# Patient Record
Sex: FEMALE | Race: WHITE | HISPANIC OR LATINO
[De-identification: names, ages, dates, MRNs, and addresses within clinical notes are randomized per-mention and may not be internally consistent; named-entity substitution may affect disease eponyms.]

---

## 2017-01-04 PROBLEM — Z00.00 ENCOUNTER FOR PREVENTIVE HEALTH EXAMINATION: Status: ACTIVE | Noted: 2017-01-04

## 2017-03-06 ENCOUNTER — APPOINTMENT (OUTPATIENT)
Dept: NEUROLOGY | Facility: CLINIC | Age: 82
End: 2017-03-06

## 2017-03-06 VITALS
HEIGHT: 62 IN | SYSTOLIC BLOOD PRESSURE: 176 MMHG | BODY MASS INDEX: 19.32 KG/M2 | HEART RATE: 76 BPM | DIASTOLIC BLOOD PRESSURE: 76 MMHG | WEIGHT: 105 LBS

## 2017-03-06 DIAGNOSIS — H40.9 UNSPECIFIED GLAUCOMA: ICD-10-CM

## 2017-03-07 ENCOUNTER — APPOINTMENT (OUTPATIENT)
Dept: MRI IMAGING | Facility: CLINIC | Age: 82
End: 2017-03-07

## 2017-03-07 ENCOUNTER — APPOINTMENT (OUTPATIENT)
Dept: NEUROLOGY | Facility: CLINIC | Age: 82
End: 2017-03-07

## 2017-04-02 ENCOUNTER — FORM ENCOUNTER (OUTPATIENT)
Age: 82
End: 2017-04-02

## 2017-04-03 ENCOUNTER — APPOINTMENT (OUTPATIENT)
Dept: MRI IMAGING | Facility: CLINIC | Age: 82
End: 2017-04-03

## 2017-04-03 ENCOUNTER — OUTPATIENT (OUTPATIENT)
Dept: OUTPATIENT SERVICES | Facility: HOSPITAL | Age: 82
LOS: 1 days | End: 2017-04-03
Payer: COMMERCIAL

## 2017-04-03 DIAGNOSIS — R41.89 OTHER SYMPTOMS AND SIGNS INVOLVING COGNITIVE FUNCTIONS AND AWARENESS: ICD-10-CM

## 2017-04-03 PROCEDURE — 70551 MRI BRAIN STEM W/O DYE: CPT

## 2017-04-24 ENCOUNTER — APPOINTMENT (OUTPATIENT)
Dept: NEUROLOGY | Facility: CLINIC | Age: 82
End: 2017-04-24

## 2017-04-24 VITALS
WEIGHT: 105 LBS | HEIGHT: 62 IN | HEART RATE: 38 BPM | BODY MASS INDEX: 19.32 KG/M2 | DIASTOLIC BLOOD PRESSURE: 79 MMHG | SYSTOLIC BLOOD PRESSURE: 143 MMHG

## 2017-10-24 ENCOUNTER — APPOINTMENT (OUTPATIENT)
Dept: NEUROLOGY | Facility: CLINIC | Age: 82
End: 2017-10-24

## 2018-02-06 ENCOUNTER — APPOINTMENT (OUTPATIENT)
Dept: NEUROLOGY | Facility: CLINIC | Age: 83
End: 2018-02-06
Payer: MEDICARE

## 2018-02-06 VITALS
DIASTOLIC BLOOD PRESSURE: 70 MMHG | BODY MASS INDEX: 19.32 KG/M2 | HEIGHT: 62 IN | HEART RATE: 58 BPM | WEIGHT: 105 LBS | SYSTOLIC BLOOD PRESSURE: 142 MMHG

## 2018-02-06 PROCEDURE — 99214 OFFICE O/P EST MOD 30 MIN: CPT

## 2018-02-06 RX ORDER — LOVASTATIN 20 MG/1
20 TABLET ORAL
Qty: 90 | Refills: 0 | Status: DISCONTINUED | COMMUNITY
Start: 2017-01-26 | End: 2018-02-06

## 2018-02-06 RX ORDER — DONEPEZIL HYDROCHLORIDE 10 MG/1
10 TABLET ORAL DAILY
Qty: 135 | Refills: 1 | Status: DISCONTINUED | COMMUNITY
Start: 2017-03-06 | End: 2018-02-06

## 2018-02-06 RX ORDER — VALSARTAN 80 MG/1
80 TABLET, COATED ORAL
Qty: 90 | Refills: 0 | Status: DISCONTINUED | COMMUNITY
Start: 2017-03-25 | End: 2018-02-06

## 2018-02-06 RX ORDER — TRAZODONE HYDROCHLORIDE 50 MG/1
50 TABLET ORAL
Qty: 90 | Refills: 0 | Status: DISCONTINUED | COMMUNITY
Start: 2016-12-23 | End: 2018-02-06

## 2018-02-09 ENCOUNTER — TRANSCRIPTION ENCOUNTER (OUTPATIENT)
Age: 83
End: 2018-02-09

## 2018-11-06 ENCOUNTER — APPOINTMENT (OUTPATIENT)
Dept: NEUROLOGY | Facility: CLINIC | Age: 83
End: 2018-11-06
Payer: MEDICARE

## 2018-11-06 VITALS — SYSTOLIC BLOOD PRESSURE: 154 MMHG | HEART RATE: 56 BPM | DIASTOLIC BLOOD PRESSURE: 81 MMHG

## 2018-11-06 PROCEDURE — 99214 OFFICE O/P EST MOD 30 MIN: CPT

## 2019-08-06 ENCOUNTER — APPOINTMENT (OUTPATIENT)
Dept: NEUROLOGY | Facility: CLINIC | Age: 84
End: 2019-08-06
Payer: MEDICARE

## 2019-08-06 VITALS
SYSTOLIC BLOOD PRESSURE: 158 MMHG | BODY MASS INDEX: 19.32 KG/M2 | DIASTOLIC BLOOD PRESSURE: 74 MMHG | WEIGHT: 105 LBS | HEART RATE: 66 BPM | HEIGHT: 62 IN

## 2019-08-06 PROCEDURE — 99214 OFFICE O/P EST MOD 30 MIN: CPT

## 2019-08-06 NOTE — PHYSICAL EXAM
[General Appearance - Alert] : alert [General Appearance - In No Acute Distress] : in no acute distress [Oriented To Time, Place, And Person] : oriented to person, place, and time [Impaired Insight] : insight and judgment were intact [Affect] : the affect was normal [Person] : oriented to person [Place] : oriented to place [Time] : oriented to time [Concentration Intact] : normal concentrating ability [Visual Intact] : visual attention was ~T not ~L decreased [Naming Objects] : no difficulty naming common objects [Repeating Phrases] : no difficulty repeating a phrase [Writing A Sentence] : no difficulty writing a sentence [Fluency] : fluency intact [Comprehension] : comprehension intact [Reading] : reading intact [Past History] : adequate knowledge of personal past history [Total Score ___ / 30] : the patient achieved a score of [unfilled] /30 [Date / Time ___ / 5] : date / time [unfilled] / 5 [Place ___ / 5] : place [unfilled] / 5 [Registration ___ / 3] : registration [unfilled] / 3 [Serial Sevens ___/5] : serial sevens [unfilled] / 5 [Naming 2 Objects ___ / 2] : naming two objects [unfilled] / 2 [Repeating a Sentence ___ / 1] : repeating a sentence [unfilled] / 1 [Writing a Sentence ___ / 1] : write sentence [unfilled] / 1 [3-stage Verbal Command ___ / 3] : three-stage verbal command [unfilled] / 3 [Written Command ___ / 1] : written command [unfilled] / 1 [Copy a Design ___ / 1] : copy a design [unfilled] / 1 [Recall ___ / 3] : recall [unfilled] / 3 [Cranial Nerves Optic (II)] : visual acuity intact bilaterally,  visual fields full to confrontation, pupils equal round and reactive to light [Cranial Nerves Oculomotor (III)] : extraocular motion intact [Cranial Nerves Trigeminal (V)] : facial sensation intact symmetrically [Cranial Nerves Facial (VII)] : face symmetrical [Cranial Nerves Vestibulocochlear (VIII)] : hearing was intact bilaterally [Cranial Nerves Glossopharyngeal (IX)] : tongue and palate midline [Cranial Nerves Accessory (XI - Cranial And Spinal)] : head turning and shoulder shrug symmetric [Cranial Nerves Hypoglossal (XII)] : there was no tongue deviation with protrusion [Motor Strength] : muscle strength was normal in all four extremities [Involuntary Movements] : no involuntary movements were seen [No Muscle Atrophy] : normal bulk in all four extremities [Motor Handedness Right-Handed] : the patient is right hand dominant [Sensation Tactile Decrease] : light touch was intact [Sensation Pain / Temperature Decrease] : pain and temperature was intact [Sensation Vibration Decrease] : vibration was intact [Proprioception] : proprioception was intact [Balance] : balance was intact [2+] : Ankle jerk left 2+ [Sclera] : the sclera and conjunctiva were normal [PERRL With Normal Accommodation] : pupils were equal in size, round, reactive to light, with normal accommodation [Extraocular Movements] : extraocular movements were intact [Optic Disc Abnormality] : the optic disc were normal in size and color [No APD] : no afferent pupillary defect [No JOHNNY] : no internuclear ophthalmoplegia [Full Visual Field] : full visual field [Outer Ear] : the ears and nose were normal in appearance [Oropharynx] : the oropharynx was normal [Neck Appearance] : the appearance of the neck was normal [Neck Cervical Mass (___cm)] : no neck mass was observed [Jugular Venous Distention Increased] : there was no jugular-venous distention [Thyroid Diffuse Enlargement] : the thyroid was not enlarged [Thyroid Nodule] : there were no palpable thyroid nodules [Auscultation Breath Sounds / Voice Sounds] : lungs were clear to auscultation bilaterally [Heart Rate And Rhythm] : heart rate was normal and rhythm regular [Heart Sounds] : normal S1 and S2 [Heart Sounds Gallop] : no gallops [Murmurs] : no murmurs [Arterial Pulses Carotid] : carotid pulses were normal with no bruits [Heart Sounds Pericardial Friction Rub] : no pericardial rub [Full Pulse] : the pedal pulses are present [Edema] : there was no peripheral edema [Bowel Sounds] : normal bowel sounds [Abdomen Tenderness] : non-tender [Abdomen Soft] : soft [Abdomen Mass (___ Cm)] : no abdominal mass palpated [No CVA Tenderness] : no ~M costovertebral angle tenderness [No Spinal Tenderness] : no spinal tenderness [Abnormal Walk] : normal gait [Nail Clubbing] : no clubbing  or cyanosis of the fingernails [Musculoskeletal - Swelling] : no joint swelling seen [Motor Tone] : muscle strength and tone were normal [Skin Turgor] : normal skin turgor [Skin Color & Pigmentation] : normal skin color and pigmentation [] : no rash [FreeTextEntry1] : Exam stable. [Motor Strength Upper Extremities Bilaterally] : strength was normal in both upper extremities [Motor Strength Lower Extremities Bilaterally] : strength was normal in both lower extremities [Romberg's Sign] : Romberg's sign was negtive [Allodynia] : no ~T allodynia present [Dysesthesia] : no dysesthesia [Hyperesthesia] : no hyperesthesia [Limited Balance] : balance was intact [Past-pointing] : there was no past-pointing [Tremor] : no tremor present [Coordination - Dysmetria Impaired Finger-to-Nose Bilateral] : not present [Dysdiadochokinesia Bilaterally] : not present [Coordination - Dysmetria Impaired Heel-to-Shin Bilateral] : not present [Plantar Reflex Right Only] : normal on the right [Plantar Reflex Left Only] : normal on the left [FreeTextEntry4] : Presidents: 1/5\par Alt pattern intact.\par Clock: 1/3.

## 2019-08-06 NOTE — HISTORY OF PRESENT ILLNESS
[FreeTextEntry1] : HPI-Interval Hx [id number]:\par Had cataract sx in 2/2019, but does not recall that.\par She feels well, and has no conscience of being forgetful. At times, she has forgot name of grandson.\par Home attendant changed, with better social interaction now.\par Sleep: she tends to wake up and start doing things around the house. No daytime naps.\par Appetite: intact, gained 2lb. \par Motor: ok, no issues. \par Per wife, pt likes to drink wine, may go out and get wine for herself from time to time. She is mostly with the [facility]HA, but spends a few hours a day alone, and in one occasion she has roamed out, was supposed to get her phone fixed but never got to the shop. \par She has minor itch from the patch, managed with anti-H cream.\par \par HPI-Interval Hx [id number]:\par Pt doing fine. She was started on Trazodone 50mg.\par They have not looked into the trials at [facility], daughter works and pt does not want to.\par No AE from meds. \par Eats and sleeps well, bug she might forget to eat her meals at times. \par Per daughter, she has got lost in the last few months a few times, walking in the neighborhood. \par \par HPI-Interval Hx [id number]:\par Did not comply with Aricept, now on Rivastigmine Patch 13.3mg. \par Overall doing well, no AE. Eating well.\par Sleeps overall well, at times she wakes up at random times.\par \par MRI brain reviewed with daughter, showing mixed pathology, mild vascular, and some atrophy in bilateral temporal lobes. \par \par HPI-Interval Hx [id number]:\par MRI, labs and US carotids reviewed. Significant atrophy of FTP lobes. \par Still very active, partly lives with her daughter a few days a week.\par Rest is stable.\par \par PMH:\par 84yo RH HW, HTN, HLD, glaucoma, here for evaluation of dementia. \par Family and PMD have noticed forgetfulness for c.a. 2y.  Unclear how it started, pt says she is fine, daughter does not live with her.\par Currently, she appears to be disoriented at times, she has issues with organization skills, she forgets dates, at times names. \par ADl: intact.\par IADL: needs help for shiela etc.\par Does not drive.\par Sleep: takes Trazodone, at times difficulty falling asleep.\par Mood: no issues. No anxiety.\par Former jose.

## 2019-08-06 NOTE — ASSESSMENT
[FreeTextEntry1] : Assessment: \par 87yo RH HW, cognitive deficit-memory and visuo-spatial issues, no parkinsonism. \par MRI with significant atrophy, not significant vascular lesions. US carotids benign. Labs ok.\par Slow progression, mostly related to STM and orientation, but self care and ADL are still ok.\par Motor-wise she is fine and very active. 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

## 2020-02-10 ENCOUNTER — APPOINTMENT (OUTPATIENT)
Dept: NEUROLOGY | Facility: CLINIC | Age: 85
End: 2020-02-10

## 2020-04-13 ENCOUNTER — APPOINTMENT (OUTPATIENT)
Dept: NEUROLOGY | Facility: CLINIC | Age: 85
End: 2020-04-13

## 2020-09-21 ENCOUNTER — APPOINTMENT (OUTPATIENT)
Dept: NEUROLOGY | Facility: CLINIC | Age: 85
End: 2020-09-21
Payer: MEDICARE

## 2020-09-21 VITALS — TEMPERATURE: 97.3 F

## 2020-09-21 VITALS
SYSTOLIC BLOOD PRESSURE: 110 MMHG | HEART RATE: 53 BPM | DIASTOLIC BLOOD PRESSURE: 64 MMHG | WEIGHT: 105 LBS | HEIGHT: 62 IN | BODY MASS INDEX: 19.32 KG/M2

## 2020-09-21 PROCEDURE — 99214 OFFICE O/P EST MOD 30 MIN: CPT

## 2020-09-21 RX ORDER — RIVASTIGMINE 13.3 MG/24H
13.3 PATCH, EXTENDED RELEASE TRANSDERMAL DAILY
Qty: 30 | Refills: 2 | Status: DISCONTINUED | COMMUNITY
Start: 2018-02-06 | End: 2020-09-21

## 2020-09-21 RX ORDER — RISPERIDONE 0.5 MG/1
0.5 TABLET, FILM COATED ORAL DAILY
Refills: 0 | Status: DISCONTINUED | COMMUNITY
Start: 2020-09-21 | End: 2020-09-21

## 2020-09-21 NOTE — HISTORY OF PRESENT ILLNESS
[FreeTextEntry1] : HPI-Interval Hx [id number]:\par NO COVID.\par \par Since last seen, pt has progressed, STM is worse, she forgets she eats and wants to eat again.\par She has not tolerated Rivastigmine, gave her a rash. Started on Donepezil 10mg again, no AE.\par She was prescribed Risperidone (?mg), for agitation, never started. In one occasion she was upset with the aid.\par Delusions to have seen dead people, unclear if actual hallucinations. \par Sleep: stable.\par Appetite: wants fresh food, and likes to eat.\par Motor: intact.\par ADl: needs prompting for washing; recent UTI with some incontinence and using wrong place to urinate\par IADL: limited; cannot manage medications; cannot cook. \par CDR: 1-1.5.\par \par \par HPI-Interval Hx [id number]:\par Had cataract sx in 2/2019, but does not recall that.\par She feels well, and has no conscience of being forgetful. At times, she has forgot name of grandson.\par Home attendant changed, with better social interaction now.\par Sleep: she tends to wake up and start doing things around the house. No daytime naps.\par Appetite: intact, gained 2lb. \par Motor: ok, no issues. \par Per wife, pt likes to drink wine, may go out and get wine for herself from time to time. She is mostly with the HHA, but spends a few hours a day alone, and in one occasion she has roamed out, was supposed to get her phone fixed but never got to the shop. \par She has minor itch from the patch, managed with anti-H cream.\par \par HPI-Interval Hx [id number]:\par Pt doing fine. She was started on Trazodone 50mg.\par They have not looked into the trials at [facility], daughter works and pt does not want to.\par No AE from meds. \par Eats and sleeps well, bug she might forget to eat her meals at times. \par Per daughter, she has got lost in the last few months a few times, walking in the neighborhood. \par \par HPI-Interval Hx [id number]:\par Did not comply with Aricept, now on Rivastigmine Patch 13.3mg. \par Overall doing well, no AE. Eating well.\par Sleeps overall well, at times she wakes up at random times.\par \par MRI brain reviewed with daughter, showing mixed pathology, mild vascular, and some atrophy in bilateral temporal lobes. \par \par HPI-Interval Hx [id number]:\par MRI, labs and US carotids reviewed. Significant atrophy of FTP lobes. \par Still very active, partly lives with her daughter a few days a week.\par Rest is stable.\par \par PMH:\par 84yo RH HW, HTN, HLD, glaucoma, here for evaluation of dementia. \par Family and PMD have noticed forgetfulness for c.a. 2y.  Unclear how it started, pt says she is fine, daughter does not live with her.\par Currently, she appears to be disoriented at times, she has issues with organization skills, she forgets dates, at times names. \par ADl: intact.\par IADL: needs help for shiela etc.\par Does not drive.\par Sleep: takes Trazodone, at times difficulty falling asleep.\par Mood: no issues. No anxiety.\par Former jose.

## 2020-09-21 NOTE — ASSESSMENT
[FreeTextEntry1] : Assessment: \par 88yo RH HW, cognitive deficit-memory and visuo-spatial issues, no parkinsonism. \par Clinical progression and imaging c/w LOAD.\par \par Ipression: \par LOAD.\par \par Plan: \par -Cont Donepezil 10mg\par -Trazodone 50mg\par -no need for Risperidone\par -keep active.\par \par RTC in 6 months.

## 2020-12-14 ENCOUNTER — TRANSCRIPTION ENCOUNTER (OUTPATIENT)
Age: 85
End: 2020-12-14

## 2020-12-30 NOTE — PHYSICAL EXAM
Oriented - self; Oriented - place; Oriented - time [General Appearance - Alert] : alert [General Appearance - In No Acute Distress] : in no acute distress [Oriented To Time, Place, And Person] : oriented to person, place, and time [Impaired Insight] : insight and judgment were intact [Affect] : the affect was normal [Person] : oriented to person [Place] : oriented to place [Time] : oriented to time [Concentration Intact] : normal concentrating ability [Visual Intact] : visual attention was ~T not ~L decreased [Naming Objects] : no difficulty naming common objects [Repeating Phrases] : no difficulty repeating a phrase [Writing A Sentence] : no difficulty writing a sentence [Fluency] : fluency intact [Comprehension] : comprehension intact [Reading] : reading intact [Past History] : adequate knowledge of personal past history [Registration ___ / 3] : registration [unfilled] / 3 [Naming 2 Objects ___ / 2] : naming two objects [unfilled] / 2 [Repeating a Sentence ___ / 1] : repeating a sentence [unfilled] / 1 [Writing a Sentence ___ / 1] : write sentence [unfilled] / 1 [3-stage Verbal Command ___ / 3] : three-stage verbal command [unfilled] / 3 [Written Command ___ / 1] : written command [unfilled] / 1 [Copy a Design ___ / 1] : copy a design [unfilled] / 1 [Cranial Nerves Optic (II)] : visual acuity intact bilaterally,  visual fields full to confrontation, pupils equal round and reactive to light [Cranial Nerves Oculomotor (III)] : extraocular motion intact [Cranial Nerves Trigeminal (V)] : facial sensation intact symmetrically [Cranial Nerves Facial (VII)] : face symmetrical [Cranial Nerves Vestibulocochlear (VIII)] : hearing was intact bilaterally [Cranial Nerves Glossopharyngeal (IX)] : tongue and palate midline [Cranial Nerves Accessory (XI - Cranial And Spinal)] : head turning and shoulder shrug symmetric [Cranial Nerves Hypoglossal (XII)] : there was no tongue deviation with protrusion [Motor Strength] : muscle strength was normal in all four extremities [Involuntary Movements] : no involuntary movements were seen [No Muscle Atrophy] : normal bulk in all four extremities [Motor Handedness Right-Handed] : the patient is right hand dominant [Sensation Tactile Decrease] : light touch was intact [Sensation Pain / Temperature Decrease] : pain and temperature was intact [Sensation Vibration Decrease] : vibration was intact [Proprioception] : proprioception was intact [Balance] : balance was intact [2+] : Ankle jerk left 2+ [Sclera] : the sclera and conjunctiva were normal [PERRL With Normal Accommodation] : pupils were equal in size, round, reactive to light, with normal accommodation [Extraocular Movements] : extraocular movements were intact [Optic Disc Abnormality] : the optic disc were normal in size and color [No APD] : no afferent pupillary defect [No JOHNNY] : no internuclear ophthalmoplegia [Full Visual Field] : full visual field [Outer Ear] : the ears and nose were normal in appearance [Oropharynx] : the oropharynx was normal [Neck Appearance] : the appearance of the neck was normal [Neck Cervical Mass (___cm)] : no neck mass was observed [Jugular Venous Distention Increased] : there was no jugular-venous distention [Thyroid Diffuse Enlargement] : the thyroid was not enlarged [Thyroid Nodule] : there were no palpable thyroid nodules [Auscultation Breath Sounds / Voice Sounds] : lungs were clear to auscultation bilaterally [Heart Rate And Rhythm] : heart rate was normal and rhythm regular [Heart Sounds] : normal S1 and S2 [Heart Sounds Gallop] : no gallops [Murmurs] : no murmurs [Heart Sounds Pericardial Friction Rub] : no pericardial rub [Arterial Pulses Carotid] : carotid pulses were normal with no bruits [Full Pulse] : the pedal pulses are present [Edema] : there was no peripheral edema [Bowel Sounds] : normal bowel sounds [Abdomen Soft] : soft [Abdomen Tenderness] : non-tender [Abdomen Mass (___ Cm)] : no abdominal mass palpated [No CVA Tenderness] : no ~M costovertebral angle tenderness [No Spinal Tenderness] : no spinal tenderness [Abnormal Walk] : normal gait [Nail Clubbing] : no clubbing  or cyanosis of the fingernails [Musculoskeletal - Swelling] : no joint swelling seen [Motor Tone] : muscle strength and tone were normal [Skin Color & Pigmentation] : normal skin color and pigmentation [Skin Turgor] : normal skin turgor [] : no rash [Total Score ___ / 30] : the patient achieved a score of [unfilled] /30 [Date / Time ___ / 5] : date / time [unfilled] / 5 [Place ___ / 5] : place [unfilled] / 5 [Serial Sevens ___/5] : serial sevens [unfilled] / 5 [Recall ___ / 3] : recall [unfilled] / 3 [Motor Strength Upper Extremities Bilaterally] : strength was normal in both upper extremities [Motor Strength Lower Extremities Bilaterally] : strength was normal in both lower extremities [Romberg's Sign] : Romberg's sign was negtive [Allodynia] : no ~T allodynia present [Dysesthesia] : no dysesthesia [Hyperesthesia] : no hyperesthesia [Limited Balance] : balance was intact [Past-pointing] : there was no past-pointing [Tremor] : no tremor present [Dysdiadochokinesia Bilaterally] : not present [Coordination - Dysmetria Impaired Finger-to-Nose Bilateral] : not present [Coordination - Dysmetria Impaired Heel-to-Shin Bilateral] : not present [Plantar Reflex Right Only] : normal on the right [Plantar Reflex Left Only] : normal on the left [FreeTextEntry4] : Presidents: 0/5\par Alt pattern intact.\par Clock: 1/3.

## 2021-03-01 ENCOUNTER — APPOINTMENT (OUTPATIENT)
Dept: NEUROLOGY | Facility: CLINIC | Age: 86
End: 2021-03-01
Payer: MEDICARE

## 2021-03-01 VITALS
SYSTOLIC BLOOD PRESSURE: 169 MMHG | HEIGHT: 62 IN | HEART RATE: 52 BPM | DIASTOLIC BLOOD PRESSURE: 78 MMHG | BODY MASS INDEX: 19.51 KG/M2 | WEIGHT: 106 LBS

## 2021-03-01 VITALS — TEMPERATURE: 97.7 F

## 2021-03-01 PROCEDURE — 99072 ADDL SUPL MATRL&STAF TM PHE: CPT

## 2021-03-01 PROCEDURE — 99214 OFFICE O/P EST MOD 30 MIN: CPT

## 2021-03-01 NOTE — ASSESSMENT
[FreeTextEntry1] : Assessment: \par 86yo RH HW, progressive cognitive decline, due to Alzheimer's disease.\par Significant progression, with cognitive and functional decline since 9/2020. \par Motor issues are due to deconditioning/sedentary life, no parkinsonism.\par \par Ipression: \par -LOAD\par -deconditioning\par \par Plan: \par -Cont Donepezil 10mg\par -Trazodone 50mg\par -keep active-would try at home first and consider PT if needed.\par \par \par A thorough discussion was entertained with the patient/caregiver regarding the use of psychoactive medications, their possible benefits and AE profile, including the risk of cardiovascular complications, including but not limited to applicable black box warning and teratogenicity, where appropriate.\par We discussed the benefits of being active, physically and mentally, and the need to to establish a routine in this respect.\par Driving abilities and firearms possession and use were discussed, in relation to progression of the cognitive decline, and the need to assess them periodically.\par Patient/caregiver advised to bring previous records to this office.\par All questions were answered at the time of the visit. We are certainly available for further discussion as needed.\par Patient/caregiver fully understands and agree with the plan.\par

## 2021-03-01 NOTE — HISTORY OF PRESENT ILLNESS
[FreeTextEntry1] : HPI-Interval Hx [id number]:\par pt here with daughter for follow-up\par Over the last few months, she has declined significantly\par She has mis-recognition of her own home and often asks to leave; she was found wandering outside, and was brought back by passers-by in a few occasions\par She confuses night and day, but overall sleeps well\par Appetite is stable, she needs to be reminded to eat, no change in weight\par STM is poorer as well\par Communication is ok, speech preserved.\par Motor: slower, more careful with steps; one fall a month ago, tripped; more sedentary lately, does not like to move much\par She sometimes does not recognize familiar people around her.\par Psych: tends to get upset easily, but no overt agitation, nor productive symptoms. \par \par ADL: limited to eat, dressing and washing\par IADL: poor; can use the TV.\par CDR: 2.0.\par \par \par HPI-Interval Hx 20200921:\par NO COVID.\par \par Since last seen, pt has progressed, STM is worse, she forgets she eats and wants to eat again.\par She has not tolerated Rivastigmine, gave her a rash. Started on Donepezil 10mg again, no AE.\par She was prescribed Risperidone (?mg), for agitation, never started. In one occasion she was upset with the aid.\par Delusions to have seen dead people, unclear if actual hallucinations. \par Sleep: stable.\par Appetite: wants fresh food, and likes to eat.\par Motor: intact.\par ADl: needs prompting for washing; recent UTI with some incontinence and using wrong place to urinate\par IADL: limited; cannot manage medications; cannot cook. \par CDR: 1-1.5.\par \par \par HPI-Interval Hx [id number]:\par Had cataract sx in 2/2019, but does not recall that.\par She feels well, and has no conscience of being forgetful. At times, she has forgot name of grandson.\par Home attendant changed, with better social interaction now.\par Sleep: she tends to wake up and start doing things around the house. No daytime naps.\par Appetite: intact, gained 2lb. \par Motor: ok, no issues. \par Per wife, pt likes to drink wine, may go out and get wine for herself from time to time. She is mostly with the HHA, but spends a few hours a day alone, and in one occasion she has roamed out, was supposed to get her phone fixed but never got to the shop. \par She has minor itch from the patch, managed with anti-H cream.\par \par HPI-Interval Hx [id number]:\par Pt doing fine. She was started on Trazodone 50mg.\par They have not looked into the trials at [facility], daughter works and pt does not want to.\par No AE from meds. \par Eats and sleeps well, bug she might forget to eat her meals at times. \par Per daughter, she has got lost in the last few months a few times, walking in the neighborhood. \par \par HPI-Interval Hx [id number]:\par Did not comply with Aricept, now on Rivastigmine Patch 13.3mg. \par Overall doing well, no AE. Eating well.\par Sleeps overall well, at times she wakes up at random times.\par \par MRI brain reviewed with daughter, showing mixed pathology, mild vascular, and some atrophy in bilateral temporal lobes. \par \par HPI-Interval Hx [id number]:\par MRI, labs and US carotids reviewed. Significant atrophy of FTP lobes. \par Still very active, partly lives with her daughter a few days a week.\par Rest is stable.\par \par PMH:\par 84yo RH HW, HTN, HLD, glaucoma, here for evaluation of dementia. \par Family and PMD have noticed forgetfulness for c.a. 2y.  Unclear how it started, pt says she is fine, daughter does not live with her.\par Currently, she appears to be disoriented at times, she has issues with organization skills, she forgets dates, at times names. \par ADl: intact.\par IADL: needs help for shiela etc.\par Does not drive.\par Sleep: takes Trazodone, at times difficulty falling asleep.\par Mood: no issues. No anxiety.\par Former jose.

## 2021-03-01 NOTE — PHYSICAL EXAM
[General Appearance - Alert] : alert [General Appearance - In No Acute Distress] : in no acute distress [Oriented To Time, Place, And Person] : oriented to person, place, and time [Impaired Insight] : insight and judgment were intact [Affect] : the affect was normal [Person] : oriented to person [Concentration Intact] : normal concentrating ability [Visual Intact] : visual attention was ~T not ~L decreased [Naming Objects] : no difficulty naming common objects [Repeating Phrases] : no difficulty repeating a phrase [Writing A Sentence] : no difficulty writing a sentence [Fluency] : fluency intact [Comprehension] : comprehension intact [Reading] : reading intact [Past History] : adequate knowledge of personal past history [Vocabulary] : adequate range of vocabulary [Total Score ___ / 30] : the patient achieved a score of [unfilled] /30 [Date / Time ___ / 5] : date / time [unfilled] / 5 [Place ___ / 5] : place [unfilled] / 5 [Registration ___ / 3] : registration [unfilled] / 3 [Serial Sevens ___/5] : serial sevens [unfilled] / 5 [Naming 2 Objects ___ / 2] : naming two objects [unfilled] / 2 [Repeating a Sentence ___ / 1] : repeating a sentence [unfilled] / 1 [Writing a Sentence ___ / 1] : write sentence [unfilled] / 1 [3-stage Verbal Command ___ / 3] : three-stage verbal command [unfilled] / 3 [Written Command ___ / 1] : written command [unfilled] / 1 [Copy a Design ___ / 1] : copy a design [unfilled] / 1 [Recall ___ / 3] : recall [unfilled] / 3 [Cranial Nerves Optic (II)] : visual acuity intact bilaterally,  visual fields full to confrontation, pupils equal round and reactive to light [Cranial Nerves Oculomotor (III)] : extraocular motion intact [Cranial Nerves Trigeminal (V)] : facial sensation intact symmetrically [Cranial Nerves Facial (VII)] : face symmetrical [Cranial Nerves Vestibulocochlear (VIII)] : hearing was intact bilaterally [Cranial Nerves Glossopharyngeal (IX)] : tongue and palate midline [Cranial Nerves Accessory (XI - Cranial And Spinal)] : head turning and shoulder shrug symmetric [Cranial Nerves Hypoglossal (XII)] : there was no tongue deviation with protrusion [Motor Strength] : muscle strength was normal in all four extremities [Involuntary Movements] : no involuntary movements were seen [No Muscle Atrophy] : normal bulk in all four extremities [Motor Handedness Right-Handed] : the patient is right hand dominant [Sensation Tactile Decrease] : light touch was intact [Sensation Pain / Temperature Decrease] : pain and temperature was intact [Sensation Vibration Decrease] : vibration was intact [Proprioception] : proprioception was intact [Balance] : balance was intact [2+] : Ankle jerk left 2+ [Sclera] : the sclera and conjunctiva were normal [PERRL With Normal Accommodation] : pupils were equal in size, round, reactive to light, with normal accommodation [Extraocular Movements] : extraocular movements were intact [Optic Disc Abnormality] : the optic disc were normal in size and color [No APD] : no afferent pupillary defect [No JOHNNY] : no internuclear ophthalmoplegia [Full Visual Field] : full visual field [Outer Ear] : the ears and nose were normal in appearance [Oropharynx] : the oropharynx was normal [Neck Appearance] : the appearance of the neck was normal [Neck Cervical Mass (___cm)] : no neck mass was observed [Jugular Venous Distention Increased] : there was no jugular-venous distention [Thyroid Diffuse Enlargement] : the thyroid was not enlarged [Thyroid Nodule] : there were no palpable thyroid nodules [Auscultation Breath Sounds / Voice Sounds] : lungs were clear to auscultation bilaterally [Heart Rate And Rhythm] : heart rate was normal and rhythm regular [Heart Sounds] : normal S1 and S2 [Heart Sounds Gallop] : no gallops [Murmurs] : no murmurs [Heart Sounds Pericardial Friction Rub] : no pericardial rub [Arterial Pulses Carotid] : carotid pulses were normal with no bruits [Full Pulse] : the pedal pulses are present [Edema] : there was no peripheral edema [Bowel Sounds] : normal bowel sounds [Abdomen Soft] : soft [Abdomen Tenderness] : non-tender [Abdomen Mass (___ Cm)] : no abdominal mass palpated [No CVA Tenderness] : no ~M costovertebral angle tenderness [No Spinal Tenderness] : no spinal tenderness [Abnormal Walk] : normal gait [Nail Clubbing] : no clubbing  or cyanosis of the fingernails [Musculoskeletal - Swelling] : no joint swelling seen [Motor Tone] : muscle strength and tone were normal [Skin Color & Pigmentation] : normal skin color and pigmentation [Skin Turgor] : normal skin turgor [] : no rash [Place] : disoriented to place [Time] : disoriented to time [Current Events] : inadequate knowledge of current events [Motor Strength Upper Extremities Bilaterally] : strength was normal in both upper extremities [Motor Strength Lower Extremities Bilaterally] : strength was normal in both lower extremities [Romberg's Sign] : Romberg's sign was negtive [Allodynia] : no ~T allodynia present [Dysesthesia] : no dysesthesia [Hyperesthesia] : no hyperesthesia [Limited Balance] : balance was intact [Past-pointing] : there was no past-pointing [Tremor] : no tremor present [Dysdiadochokinesia Bilaterally] : not present [Coordination - Dysmetria Impaired Finger-to-Nose Bilateral] : not present [Coordination - Dysmetria Impaired Heel-to-Shin Bilateral] : not present [Plantar Reflex Right Only] : normal on the right [Plantar Reflex Left Only] : normal on the left [FreeTextEntry4] : Presidents: 0/5\par Alt pattern intact.\par Clock: 1/3.

## 2021-06-07 ENCOUNTER — APPOINTMENT (OUTPATIENT)
Dept: NEUROLOGY | Facility: CLINIC | Age: 86
End: 2021-06-07
Payer: MEDICARE

## 2021-06-07 VITALS
SYSTOLIC BLOOD PRESSURE: 121 MMHG | HEART RATE: 59 BPM | HEIGHT: 62 IN | DIASTOLIC BLOOD PRESSURE: 72 MMHG | BODY MASS INDEX: 17.66 KG/M2 | WEIGHT: 96 LBS

## 2021-06-07 PROCEDURE — 99214 OFFICE O/P EST MOD 30 MIN: CPT

## 2021-06-07 NOTE — HISTORY OF PRESENT ILLNESS
[FreeTextEntry1] : HPI-Interval Hx 20210607:\par pt had a few falls in April, one with broken nose.\par Also got PPM put in in April. \par Likely bradycardia with heart block.\par Also started on Risperidone, now 1mg BID.\par Appetite ok, but she has lost weight.\par Sleep: takes a long time to fall asleep.\par More restless, per daughter there is some shuffle.\par \par \par HPI-Interval Hx [id number]:\par pt here with daughter for follow-up\par Over the last few months, she has declined significantly\par She has mis-recognition of her own home and often asks to leave; she was found wandering outside, and was brought back by passers-by in a few occasions\par She confuses night and day, but overall sleeps well\par Appetite is stable, she needs to be reminded to eat, no change in weight\par STM is poorer as well\par Communication is ok, speech preserved.\par Motor: slower, more careful with steps; one fall a month ago, tripped; more sedentary lately, does not like to move much\par She sometimes does not recognize familiar people around her.\par Psych: tends to get upset easily, but no overt agitation, nor productive symptoms. \par \par ADL: limited to eat, dressing and washing\par IADL: poor; can use the TV.\par CDR: 2.0.\par \par \par HPI-Interval Hx [id number]:\par NO COVID.\par \par Since last seen, pt has progressed, STM is worse, she forgets she eats and wants to eat again.\par She has not tolerated Rivastigmine, gave her a rash. Started on Donepezil 10mg again, no AE.\par She was prescribed Risperidone (?mg), for agitation, never started. In one occasion she was upset with the aid.\par Delusions to have seen dead people, unclear if actual hallucinations. \par Sleep: stable.\par Appetite: wants fresh food, and likes to eat.\par Motor: intact.\par ADl: needs prompting for washing; recent UTI with some incontinence and using wrong place to urinate\par IADL: limited; cannot manage medications; cannot cook. \par CDR: 1-1.5.\par \par \par HPI-Interval Hx [id number]:\par Had cataract sx in 2/2019, but does not recall that.\par She feels well, and has no conscience of being forgetful. At times, she has forgot name of grandson.\par Home attendant changed, with better social interaction now.\par Sleep: she tends to wake up and start doing things around the house. No daytime naps.\par Appetite: intact, gained 2lb. \par Motor: ok, no issues. \par Per wife, pt likes to drink wine, may go out and get wine for herself from time to time. She is mostly with the HHA, but spends a few hours a day alone, and in one occasion she has roamed out, was supposed to get her phone fixed but never got to the shop. \par She has minor itch from the patch, managed with anti-H cream.\par \par HPI-Interval Hx [id number]:\par Pt doing fine. She was started on Trazodone 50mg.\par They have not looked into the trials at [facility], daughter works and pt does not want to.\par No AE from meds. \par Eats and sleeps well, bug she might forget to eat her meals at times. \par Per daughter, she has got lost in the last few months a few times, walking in the neighborhood. \par \par HPI-Interval Hx [id number]:\par Did not comply with Aricept, now on Rivastigmine Patch 13.3mg. \par Overall doing well, no AE. Eating well.\par Sleeps overall well, at times she wakes up at random times.\par \par MRI brain reviewed with daughter, showing mixed pathology, mild vascular, and some atrophy in bilateral temporal lobes. \par \par HPI-Interval Hx [id number]:\par MRI, labs and US carotids reviewed. Significant atrophy of FTP lobes. \par Still very active, partly lives with her daughter a few days a week.\par Rest is stable.\par \par PMH:\par 82yo RH HW, HTN, HLD, glaucoma, here for evaluation of dementia. \par Family and PMD have noticed forgetfulness for c.a. 2y.  Unclear how it started, pt says she is fine, daughter does not live with her.\par Currently, she appears to be disoriented at times, she has issues with organization skills, she forgets dates, at times names. \par ADl: intact.\par IADL: needs help for shiela etc.\par Does not drive.\par Sleep: takes Trazodone, at times difficulty falling asleep.\par Mood: no issues. No anxiety.\par Former jose.

## 2021-06-07 NOTE — ASSESSMENT
[FreeTextEntry1] : Assessment: \par 87yo RH HW, progressive cognitive decline, due to Alzheimer's disease.\par Significant progression, with cognitive and functional decline. \par Motor issues are due to deconditioning/sedentary life, no parkinsonism, but the recent use of Risperidone may have given her some EPS.\par Would prefer to change to Olanzapine.\par \par Ipression: \par -LOAD\par -deconditioning\par -agitation-some aggressive behavior with HHA\par \par Plan: \par -reduce Risperidone to 0.5mg BID and after we get the labs from PCP we can change to olanzapine.\par Monitor appetite and weight.\par \par \par A thorough discussion was entertained with the patient/caregiver regarding the use of psychoactive medications, their possible benefits and AE profile, including the risk of cardiovascular complications, including but not limited to applicable black box warning and teratogenicity, where appropriate.\par We discussed the benefits of being active, physically and mentally, and the need to to establish a routine in this respect.\par Driving abilities and firearms possession and use were discussed, in relation to progression of the cognitive decline, and the need to assess them periodically.\par Patient/caregiver advised to bring previous records to this office.\par All questions were answered at the time of the visit. We are certainly available for further discussion as needed.\par Patient/caregiver fully understands and agree with the plan.\par  no no

## 2021-06-07 NOTE — PHYSICAL EXAM
[General Appearance - Alert] : alert [General Appearance - In No Acute Distress] : in no acute distress [Oriented To Time, Place, And Person] : oriented to person, place, and time [Impaired Insight] : insight and judgment were intact [Affect] : the affect was normal [Person] : oriented to person [Concentration Intact] : normal concentrating ability [Visual Intact] : visual attention was ~T not ~L decreased [Naming Objects] : no difficulty naming common objects [Repeating Phrases] : no difficulty repeating a phrase [Writing A Sentence] : no difficulty writing a sentence [Fluency] : fluency intact [Comprehension] : comprehension intact [Reading] : reading intact [Past History] : adequate knowledge of personal past history [Vocabulary] : adequate range of vocabulary [Total Score ___ / 30] : the patient achieved a score of [unfilled] /30 [Date / Time ___ / 5] : date / time [unfilled] / 5 [Place ___ / 5] : place [unfilled] / 5 [Registration ___ / 3] : registration [unfilled] / 3 [Serial Sevens ___/5] : serial sevens [unfilled] / 5 [Naming 2 Objects ___ / 2] : naming two objects [unfilled] / 2 [Repeating a Sentence ___ / 1] : repeating a sentence [unfilled] / 1 [Writing a Sentence ___ / 1] : write sentence [unfilled] / 1 [3-stage Verbal Command ___ / 3] : three-stage verbal command [unfilled] / 3 [Written Command ___ / 1] : written command [unfilled] / 1 [Copy a Design ___ / 1] : copy a design [unfilled] / 1 [Recall ___ / 3] : recall [unfilled] / 3 [Cranial Nerves Optic (II)] : visual acuity intact bilaterally,  visual fields full to confrontation, pupils equal round and reactive to light [Cranial Nerves Oculomotor (III)] : extraocular motion intact [Cranial Nerves Trigeminal (V)] : facial sensation intact symmetrically [Cranial Nerves Facial (VII)] : face symmetrical [Cranial Nerves Vestibulocochlear (VIII)] : hearing was intact bilaterally [Cranial Nerves Glossopharyngeal (IX)] : tongue and palate midline [Cranial Nerves Accessory (XI - Cranial And Spinal)] : head turning and shoulder shrug symmetric [Cranial Nerves Hypoglossal (XII)] : there was no tongue deviation with protrusion [Motor Strength] : muscle strength was normal in all four extremities [Involuntary Movements] : no involuntary movements were seen [No Muscle Atrophy] : normal bulk in all four extremities [Motor Handedness Right-Handed] : the patient is right hand dominant [Sensation Tactile Decrease] : light touch was intact [Sensation Pain / Temperature Decrease] : pain and temperature was intact [Sensation Vibration Decrease] : vibration was intact [Proprioception] : proprioception was intact [Balance] : balance was intact [2+] : Ankle jerk left 2+ [Sclera] : the sclera and conjunctiva were normal [PERRL With Normal Accommodation] : pupils were equal in size, round, reactive to light, with normal accommodation [Extraocular Movements] : extraocular movements were intact [Optic Disc Abnormality] : the optic disc were normal in size and color [No APD] : no afferent pupillary defect [No JOHNNY] : no internuclear ophthalmoplegia [Full Visual Field] : full visual field [Outer Ear] : the ears and nose were normal in appearance [Oropharynx] : the oropharynx was normal [Neck Appearance] : the appearance of the neck was normal [Neck Cervical Mass (___cm)] : no neck mass was observed [Jugular Venous Distention Increased] : there was no jugular-venous distention [Thyroid Diffuse Enlargement] : the thyroid was not enlarged [Thyroid Nodule] : there were no palpable thyroid nodules [Auscultation Breath Sounds / Voice Sounds] : lungs were clear to auscultation bilaterally [Heart Rate And Rhythm] : heart rate was normal and rhythm regular [Heart Sounds] : normal S1 and S2 [Heart Sounds Gallop] : no gallops [Murmurs] : no murmurs [Heart Sounds Pericardial Friction Rub] : no pericardial rub [Arterial Pulses Carotid] : carotid pulses were normal with no bruits [Full Pulse] : the pedal pulses are present [Edema] : there was no peripheral edema [Bowel Sounds] : normal bowel sounds [Abdomen Soft] : soft [Abdomen Tenderness] : non-tender [Abdomen Mass (___ Cm)] : no abdominal mass palpated [No CVA Tenderness] : no ~M costovertebral angle tenderness [No Spinal Tenderness] : no spinal tenderness [Abnormal Walk] : normal gait [Nail Clubbing] : no clubbing  or cyanosis of the fingernails [Musculoskeletal - Swelling] : no joint swelling seen [Motor Tone] : muscle strength and tone were normal [Skin Color & Pigmentation] : normal skin color and pigmentation [Skin Turgor] : normal skin turgor [] : no rash [Place] : disoriented to place [Time] : disoriented to time [Current Events] : inadequate knowledge of current events [Motor Strength Upper Extremities Bilaterally] : strength was normal in both upper extremities [Motor Strength Lower Extremities Bilaterally] : strength was normal in both lower extremities [Romberg's Sign] : Romberg's sign was negtive [Allodynia] : no ~T allodynia present [Dysesthesia] : no dysesthesia [Hyperesthesia] : no hyperesthesia [Limited Balance] : balance was intact [Past-pointing] : there was no past-pointing [Tremor] : no tremor present [Dysdiadochokinesia Bilaterally] : not present [Coordination - Dysmetria Impaired Finger-to-Nose Bilateral] : not present [Coordination - Dysmetria Impaired Heel-to-Shin Bilateral] : not present [Plantar Reflex Right Only] : normal on the right [Plantar Reflex Left Only] : normal on the left [FreeTextEntry4] : Presidents: 0/5\par Alt pattern intact.\par Clock: 1/3. [FreeTextEntry6] : Mild increased tone in LUE. [FreeTextEntry8] : short steps, mild shuffle, more rigid.

## 2021-06-30 RX ORDER — RISPERIDONE 1 MG/1
1 TABLET, FILM COATED ORAL TWICE DAILY
Qty: 30 | Refills: 3 | Status: DISCONTINUED | COMMUNITY
Start: 2021-05-18 | End: 2021-06-30

## 2021-06-30 RX ORDER — AMLODIPINE BESYLATE 10 MG/1
10 TABLET ORAL DAILY
Refills: 0 | Status: DISCONTINUED | COMMUNITY
Start: 2017-03-02 | End: 2021-06-30

## 2021-08-06 ENCOUNTER — APPOINTMENT (OUTPATIENT)
Dept: GERIATRICS | Facility: CLINIC | Age: 86
End: 2021-08-06
Payer: MEDICARE

## 2021-08-06 ENCOUNTER — NON-APPOINTMENT (OUTPATIENT)
Age: 86
End: 2021-08-06

## 2021-08-06 VITALS
SYSTOLIC BLOOD PRESSURE: 110 MMHG | RESPIRATION RATE: 14 BRPM | OXYGEN SATURATION: 97 % | WEIGHT: 101.13 LBS | HEART RATE: 70 BPM | HEIGHT: 62 IN | DIASTOLIC BLOOD PRESSURE: 70 MMHG | BODY MASS INDEX: 18.61 KG/M2
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
## 2021-08-06 DIAGNOSIS — M79.89 OTHER SPECIFIED SOFT TISSUE DISORDERS: ICD-10-CM

## 2021-08-06 PROCEDURE — 99205 OFFICE O/P NEW HI 60 MIN: CPT | Mod: 25

## 2021-08-06 PROCEDURE — G0444 DEPRESSION SCREEN ANNUAL: CPT

## 2021-08-13 ENCOUNTER — TRANSCRIPTION ENCOUNTER (OUTPATIENT)
Age: 86
End: 2021-08-13

## 2021-08-17 PROBLEM — M79.89 HAND SWELLING: Status: ACTIVE | Noted: 2021-08-17

## 2021-08-23 ENCOUNTER — LABORATORY RESULT (OUTPATIENT)
Age: 86
End: 2021-08-23

## 2021-08-23 ENCOUNTER — NON-APPOINTMENT (OUTPATIENT)
Age: 86
End: 2021-08-23

## 2021-08-23 ENCOUNTER — APPOINTMENT (OUTPATIENT)
Dept: GERIATRICS | Facility: CLINIC | Age: 86
End: 2021-08-23
Payer: MEDICARE

## 2021-08-23 VITALS
SYSTOLIC BLOOD PRESSURE: 150 MMHG | TEMPERATURE: 97.5 F | HEART RATE: 60 BPM | DIASTOLIC BLOOD PRESSURE: 90 MMHG | OXYGEN SATURATION: 97 % | BODY MASS INDEX: 17.92 KG/M2 | WEIGHT: 97.38 LBS | HEIGHT: 62 IN | RESPIRATION RATE: 15 BRPM

## 2021-08-23 VITALS — DIASTOLIC BLOOD PRESSURE: 80 MMHG | SYSTOLIC BLOOD PRESSURE: 158 MMHG

## 2021-08-23 DIAGNOSIS — Z09 ENCOUNTER FOR FOLLOW-UP EXAMINATION AFTER COMPLETED TREATMENT FOR CONDITIONS OTHER THAN MALIGNANT NEOPLASM: ICD-10-CM

## 2021-08-23 DIAGNOSIS — Z11.59 ENCOUNTER FOR SCREENING FOR OTHER VIRAL DISEASES: ICD-10-CM

## 2021-08-23 DIAGNOSIS — I42.9 CARDIOMYOPATHY, UNSPECIFIED: ICD-10-CM

## 2021-08-23 DIAGNOSIS — Z86.79 PERSONAL HISTORY OF OTHER DISEASES OF THE CIRCULATORY SYSTEM: ICD-10-CM

## 2021-08-23 PROCEDURE — 99483 ASSMT & CARE PLN PT COG IMP: CPT

## 2021-08-23 PROCEDURE — 93000 ELECTROCARDIOGRAM COMPLETE: CPT

## 2021-08-23 RX ORDER — LISINOPRIL 5 MG/1
5 TABLET ORAL DAILY
Refills: 0 | Status: DISCONTINUED | COMMUNITY
Start: 2021-06-30 | End: 2021-08-23

## 2021-08-23 RX ORDER — MECOBALAMIN 1000 MCG
1 TABLET,CHEWABLE ORAL
Refills: 0 | Status: DISCONTINUED | COMMUNITY
Start: 2021-03-01 | End: 2021-08-23

## 2021-08-24 LAB
25(OH)D3 SERPL-MCNC: 84.8 NG/ML
ALBUMIN SERPL ELPH-MCNC: 4.3 G/DL
ALP BLD-CCNC: 60 U/L
ALT SERPL-CCNC: 10 U/L
ANION GAP SERPL CALC-SCNC: 11 MMOL/L
APPEARANCE: ABNORMAL
AST SERPL-CCNC: 16 U/L
BASOPHILS # BLD AUTO: 0.07 K/UL
BASOPHILS NFR BLD AUTO: 0.9 %
BILIRUB SERPL-MCNC: 0.4 MG/DL
BILIRUBIN URINE: NEGATIVE
BLOOD URINE: NEGATIVE
BUN SERPL-MCNC: 12 MG/DL
CALCIUM SERPL-MCNC: 9.9 MG/DL
CHLORIDE SERPL-SCNC: 101 MMOL/L
CHOLEST SERPL-MCNC: 217 MG/DL
CO2 SERPL-SCNC: 28 MMOL/L
COLOR: NORMAL
COVID-19 NUCLEOCAPSID  GAM ANTIBODY INTERPRETATION: NEGATIVE
COVID-19 SPIKE DOMAIN ANTIBODY INTERPRETATION: POSITIVE
CREAT SERPL-MCNC: 0.69 MG/DL
EOSINOPHIL # BLD AUTO: 0.17 K/UL
EOSINOPHIL NFR BLD AUTO: 2.1 %
ESTIMATED AVERAGE GLUCOSE: 111 MG/DL
FOLATE SERPL-MCNC: 13.9 NG/ML
GLUCOSE QUALITATIVE U: NEGATIVE
GLUCOSE SERPL-MCNC: 98 MG/DL
HBA1C MFR BLD HPLC: 5.5 %
HCT VFR BLD CALC: 43.8 %
HDLC SERPL-MCNC: 96 MG/DL
HGB BLD-MCNC: 14.5 G/DL
HIV1+2 AB SPEC QL IA.RAPID: NONREACTIVE
IMM GRANULOCYTES NFR BLD AUTO: 0.4 %
KETONES URINE: NEGATIVE
LDLC SERPL CALC-MCNC: 105 MG/DL
LEUKOCYTE ESTERASE URINE: NEGATIVE
LYMPHOCYTES # BLD AUTO: 1.61 K/UL
LYMPHOCYTES NFR BLD AUTO: 20.2 %
MAN DIFF?: NORMAL
MCHC RBC-ENTMCNC: 31 PG
MCHC RBC-ENTMCNC: 33.1 GM/DL
MCV RBC AUTO: 93.8 FL
MONOCYTES # BLD AUTO: 0.79 K/UL
MONOCYTES NFR BLD AUTO: 9.9 %
NEUTROPHILS # BLD AUTO: 5.32 K/UL
NEUTROPHILS NFR BLD AUTO: 66.5 %
NITRITE URINE: NEGATIVE
NONHDLC SERPL-MCNC: 121 MG/DL
PH URINE: 8.5
PLATELET # BLD AUTO: 304 K/UL
POTASSIUM SERPL-SCNC: 4.3 MMOL/L
PROT SERPL-MCNC: 7.1 G/DL
PROTEIN URINE: NEGATIVE
RBC # BLD: 4.67 M/UL
RBC # FLD: 13 %
SARS-COV-2 AB SERPL IA-ACNC: >250 U/ML
SARS-COV-2 AB SERPL QL IA: 0.07 INDEX
SODIUM SERPL-SCNC: 140 MMOL/L
SPECIFIC GRAVITY URINE: 1.01
T PALLIDUM AB SER QL IA: NEGATIVE
TRIGL SERPL-MCNC: 77 MG/DL
TSH SERPL-ACNC: 1.47 UIU/ML
UROBILINOGEN URINE: NORMAL
VIT B12 SERPL-MCNC: 910 PG/ML
WBC # FLD AUTO: 7.99 K/UL

## 2021-08-24 RX ORDER — ERGOCALCIFEROL 1.25 MG/1
1.25 MG CAPSULE, LIQUID FILLED ORAL
Refills: 0 | Status: DISCONTINUED | COMMUNITY
Start: 2016-07-25 | End: 2021-08-24

## 2021-08-26 LAB — VIT B1 SERPL-MCNC: 162.6 NMOL/L

## 2021-08-29 ENCOUNTER — EMERGENCY (EMERGENCY)
Facility: HOSPITAL | Age: 86
LOS: 1 days | Discharge: ANOTHER HOSPITAL | End: 2021-08-29
Attending: EMERGENCY MEDICINE
Payer: COMMERCIAL

## 2021-08-29 ENCOUNTER — INPATIENT (INPATIENT)
Facility: HOSPITAL | Age: 86
LOS: 1 days | Discharge: HOME HEALTH | DRG: 86 | End: 2021-08-31
Attending: INTERNAL MEDICINE | Admitting: HOSPITALIST
Payer: COMMERCIAL

## 2021-08-29 VITALS
SYSTOLIC BLOOD PRESSURE: 155 MMHG | OXYGEN SATURATION: 94 % | RESPIRATION RATE: 18 BRPM | DIASTOLIC BLOOD PRESSURE: 73 MMHG | HEART RATE: 70 BPM | WEIGHT: 95.9 LBS | TEMPERATURE: 98 F

## 2021-08-29 VITALS
HEART RATE: 68 BPM | DIASTOLIC BLOOD PRESSURE: 80 MMHG | SYSTOLIC BLOOD PRESSURE: 185 MMHG | OXYGEN SATURATION: 97 % | RESPIRATION RATE: 18 BRPM | TEMPERATURE: 99 F | WEIGHT: 95.9 LBS

## 2021-08-29 VITALS
RESPIRATION RATE: 18 BRPM | OXYGEN SATURATION: 100 % | DIASTOLIC BLOOD PRESSURE: 87 MMHG | HEART RATE: 80 BPM | TEMPERATURE: 98 F | SYSTOLIC BLOOD PRESSURE: 184 MMHG

## 2021-08-29 DIAGNOSIS — S06.5X9A TRAUMATIC SUBDURAL HEMORRHAGE WITH LOSS OF CONSCIOUSNESS OF UNSPECIFIED DURATION, INITIAL ENCOUNTER: ICD-10-CM

## 2021-08-29 LAB
ALBUMIN SERPL ELPH-MCNC: 3.2 G/DL — LOW (ref 3.5–5)
ALP SERPL-CCNC: 55 U/L — SIGNIFICANT CHANGE UP (ref 40–120)
ALT FLD-CCNC: 14 U/L DA — SIGNIFICANT CHANGE UP (ref 10–60)
ANION GAP SERPL CALC-SCNC: 6 MMOL/L — SIGNIFICANT CHANGE UP (ref 5–17)
APPEARANCE UR: CLEAR — SIGNIFICANT CHANGE UP
APTT BLD: 33.2 SEC — SIGNIFICANT CHANGE UP (ref 27.5–35.5)
AST SERPL-CCNC: 15 U/L — SIGNIFICANT CHANGE UP (ref 10–40)
BASOPHILS # BLD AUTO: 0.05 K/UL — SIGNIFICANT CHANGE UP (ref 0–0.2)
BASOPHILS NFR BLD AUTO: 0.5 % — SIGNIFICANT CHANGE UP (ref 0–2)
BILIRUB SERPL-MCNC: 0.4 MG/DL — SIGNIFICANT CHANGE UP (ref 0.2–1.2)
BILIRUB UR-MCNC: NEGATIVE — SIGNIFICANT CHANGE UP
BUN SERPL-MCNC: 9 MG/DL — SIGNIFICANT CHANGE UP (ref 7–18)
CALCIUM SERPL-MCNC: 8.9 MG/DL — SIGNIFICANT CHANGE UP (ref 8.4–10.5)
CHLORIDE SERPL-SCNC: 104 MMOL/L — SIGNIFICANT CHANGE UP (ref 96–108)
CO2 SERPL-SCNC: 28 MMOL/L — SIGNIFICANT CHANGE UP (ref 22–31)
COLOR SPEC: YELLOW — SIGNIFICANT CHANGE UP
CREAT SERPL-MCNC: 0.62 MG/DL — SIGNIFICANT CHANGE UP (ref 0.5–1.3)
DIFF PNL FLD: NEGATIVE — SIGNIFICANT CHANGE UP
EOSINOPHIL # BLD AUTO: 0.04 K/UL — SIGNIFICANT CHANGE UP (ref 0–0.5)
EOSINOPHIL NFR BLD AUTO: 0.4 % — SIGNIFICANT CHANGE UP (ref 0–6)
GLUCOSE SERPL-MCNC: 103 MG/DL — HIGH (ref 70–99)
GLUCOSE UR QL: NEGATIVE — SIGNIFICANT CHANGE UP
HCT VFR BLD CALC: 41.6 % — SIGNIFICANT CHANGE UP (ref 34.5–45)
HGB BLD-MCNC: 13.9 G/DL — SIGNIFICANT CHANGE UP (ref 11.5–15.5)
IMM GRANULOCYTES NFR BLD AUTO: 0.5 % — SIGNIFICANT CHANGE UP (ref 0–1.5)
INR BLD: 1.04 RATIO — SIGNIFICANT CHANGE UP (ref 0.88–1.16)
KETONES UR-MCNC: NEGATIVE — SIGNIFICANT CHANGE UP
LEUKOCYTE ESTERASE UR-ACNC: NEGATIVE — SIGNIFICANT CHANGE UP
LIDOCAIN IGE QN: 86 U/L — SIGNIFICANT CHANGE UP (ref 73–393)
LYMPHOCYTES # BLD AUTO: 1.2 K/UL — SIGNIFICANT CHANGE UP (ref 1–3.3)
LYMPHOCYTES # BLD AUTO: 13.1 % — SIGNIFICANT CHANGE UP (ref 13–44)
MAGNESIUM SERPL-MCNC: 2.3 MG/DL — SIGNIFICANT CHANGE UP (ref 1.6–2.6)
MCHC RBC-ENTMCNC: 30.4 PG — SIGNIFICANT CHANGE UP (ref 27–34)
MCHC RBC-ENTMCNC: 33.4 GM/DL — SIGNIFICANT CHANGE UP (ref 32–36)
MCV RBC AUTO: 91 FL — SIGNIFICANT CHANGE UP (ref 80–100)
MONOCYTES # BLD AUTO: 0.82 K/UL — SIGNIFICANT CHANGE UP (ref 0–0.9)
MONOCYTES NFR BLD AUTO: 8.9 % — SIGNIFICANT CHANGE UP (ref 2–14)
NEUTROPHILS # BLD AUTO: 7.02 K/UL — SIGNIFICANT CHANGE UP (ref 1.8–7.4)
NEUTROPHILS NFR BLD AUTO: 76.6 % — SIGNIFICANT CHANGE UP (ref 43–77)
NITRITE UR-MCNC: NEGATIVE — SIGNIFICANT CHANGE UP
NRBC # BLD: 0 /100 WBCS — SIGNIFICANT CHANGE UP (ref 0–0)
NT-PROBNP SERPL-SCNC: 1470 PG/ML — HIGH (ref 0–450)
PH UR: 8 — SIGNIFICANT CHANGE UP (ref 5–8)
PLATELET # BLD AUTO: 271 K/UL — SIGNIFICANT CHANGE UP (ref 150–400)
POTASSIUM SERPL-MCNC: 3.9 MMOL/L — SIGNIFICANT CHANGE UP (ref 3.5–5.3)
POTASSIUM SERPL-SCNC: 3.9 MMOL/L — SIGNIFICANT CHANGE UP (ref 3.5–5.3)
PROT SERPL-MCNC: 7.3 G/DL — SIGNIFICANT CHANGE UP (ref 6–8.3)
PROT UR-MCNC: NEGATIVE — SIGNIFICANT CHANGE UP
PROTHROM AB SERPL-ACNC: 12.4 SEC — SIGNIFICANT CHANGE UP (ref 10.6–13.6)
RBC # BLD: 4.57 M/UL — SIGNIFICANT CHANGE UP (ref 3.8–5.2)
RBC # FLD: 12.5 % — SIGNIFICANT CHANGE UP (ref 10.3–14.5)
SARS-COV-2 RNA SPEC QL NAA+PROBE: SIGNIFICANT CHANGE UP
SODIUM SERPL-SCNC: 138 MMOL/L — SIGNIFICANT CHANGE UP (ref 135–145)
SP GR SPEC: 1.01 — SIGNIFICANT CHANGE UP (ref 1.01–1.02)
TROPONIN I SERPL-MCNC: <0.015 NG/ML — SIGNIFICANT CHANGE UP (ref 0–0.04)
UROBILINOGEN FLD QL: NEGATIVE — SIGNIFICANT CHANGE UP
WBC # BLD: 9.18 K/UL — SIGNIFICANT CHANGE UP (ref 3.8–10.5)
WBC # FLD AUTO: 9.18 K/UL — SIGNIFICANT CHANGE UP (ref 3.8–10.5)

## 2021-08-29 PROCEDURE — 72125 CT NECK SPINE W/O DYE: CPT

## 2021-08-29 PROCEDURE — 99291 CRITICAL CARE FIRST HOUR: CPT | Mod: 25

## 2021-08-29 PROCEDURE — 73030 X-RAY EXAM OF SHOULDER: CPT | Mod: 26,RT

## 2021-08-29 PROCEDURE — 71045 X-RAY EXAM CHEST 1 VIEW: CPT

## 2021-08-29 PROCEDURE — 36415 COLL VENOUS BLD VENIPUNCTURE: CPT

## 2021-08-29 PROCEDURE — 99291 CRITICAL CARE FIRST HOUR: CPT

## 2021-08-29 PROCEDURE — 70450 CT HEAD/BRAIN W/O DYE: CPT | Mod: 26

## 2021-08-29 PROCEDURE — 71045 X-RAY EXAM CHEST 1 VIEW: CPT | Mod: 26

## 2021-08-29 PROCEDURE — 93005 ELECTROCARDIOGRAM TRACING: CPT

## 2021-08-29 PROCEDURE — 83735 ASSAY OF MAGNESIUM: CPT

## 2021-08-29 PROCEDURE — 83880 ASSAY OF NATRIURETIC PEPTIDE: CPT

## 2021-08-29 PROCEDURE — 72125 CT NECK SPINE W/O DYE: CPT | Mod: 26

## 2021-08-29 PROCEDURE — 81003 URINALYSIS AUTO W/O SCOPE: CPT

## 2021-08-29 PROCEDURE — 85025 COMPLETE CBC W/AUTO DIFF WBC: CPT

## 2021-08-29 PROCEDURE — 70450 CT HEAD/BRAIN W/O DYE: CPT | Mod: 26,MG,77

## 2021-08-29 PROCEDURE — 83690 ASSAY OF LIPASE: CPT

## 2021-08-29 PROCEDURE — 73030 X-RAY EXAM OF SHOULDER: CPT

## 2021-08-29 PROCEDURE — 87635 SARS-COV-2 COVID-19 AMP PRB: CPT

## 2021-08-29 PROCEDURE — 84484 ASSAY OF TROPONIN QUANT: CPT

## 2021-08-29 PROCEDURE — 80053 COMPREHEN METABOLIC PANEL: CPT

## 2021-08-29 PROCEDURE — G1004: CPT

## 2021-08-29 PROCEDURE — 70450 CT HEAD/BRAIN W/O DYE: CPT

## 2021-08-29 PROCEDURE — 96374 THER/PROPH/DIAG INJ IV PUSH: CPT

## 2021-08-29 RX ORDER — MORPHINE SULFATE 50 MG/1
2 CAPSULE, EXTENDED RELEASE ORAL ONCE
Refills: 0 | Status: DISCONTINUED | OUTPATIENT
Start: 2021-08-29 | End: 2021-08-29

## 2021-08-29 RX ORDER — LEVETIRACETAM 250 MG/1
500 TABLET, FILM COATED ORAL
Refills: 0 | Status: DISCONTINUED | OUTPATIENT
Start: 2021-08-29 | End: 2021-08-31

## 2021-08-29 RX ORDER — ACETAMINOPHEN 500 MG
650 TABLET ORAL ONCE
Refills: 0 | Status: COMPLETED | OUTPATIENT
Start: 2021-08-29 | End: 2021-08-29

## 2021-08-29 RX ORDER — LEVETIRACETAM 250 MG/1
500 TABLET, FILM COATED ORAL ONCE
Refills: 0 | Status: COMPLETED | OUTPATIENT
Start: 2021-08-29 | End: 2021-08-29

## 2021-08-29 RX ADMIN — MORPHINE SULFATE 2 MILLIGRAM(S): 50 CAPSULE, EXTENDED RELEASE ORAL at 14:28

## 2021-08-29 RX ADMIN — LEVETIRACETAM 400 MILLIGRAM(S): 250 TABLET, FILM COATED ORAL at 16:26

## 2021-08-29 RX ADMIN — Medication 650 MILLIGRAM(S): at 13:31

## 2021-08-29 NOTE — ED PROVIDER NOTE - CLINICAL SUMMARY MEDICAL DECISION MAKING FREE TEXT BOX
87yo F h/o Dementia with unwitnessed fall. Currently at neuro baseline with rt forehead hematoma on exam. Will perform CT imaging head/cspine and reassess

## 2021-08-29 NOTE — ED ADULT TRIAGE NOTE - CHIEF COMPLAINT QUOTE
NIECE REPORTS PT GOT DIZZY, FELL OFF COUCH AND HIT RIGHT SIDE OF FOREHEAD AT 5 AM. DENIES LOC. ALSO C/O RIGHT ARM PAIN

## 2021-08-29 NOTE — ED PROVIDER NOTE - PHYSICAL EXAMINATION
General: NAD  HEENT: PERRL, +R frontoparietal   Cardiac: RRR, no murmurs, 2+ peripheral pulses  Chest: CTA  Abdomen: soft, non-distended, bowel sounds present, no ttp, no rebound or guarding  Extremities: no peripheral edema, calf tenderness, or leg size discrepancies  Skin: no rashes  Neuro: AAOx3, motor and sensory grossly intact  Psych: mood and affect appropriate General: NAD  HEENT: PERRL, +R frontoparietal hematoma, non-tender cspine  Cardiac: RRR, no murmurs, 2+ peripheral pulses  Chest: CTA  Abdomen: soft, non-distended, bowel sounds present, no ttp, no rebound or guarding  Extremities: no peripheral edema, calf tenderness, or leg size discrepancies  Skin: no rashes  Neuro: AAOx1, cns intact, moving all extremities against gravity, sensory intact  Psych: mood and affect appropriate

## 2021-08-29 NOTE — ED PROVIDER NOTE - OBJECTIVE STATEMENT
89yo F hx of HTN, HLD comes to ED as transfer from [place] for SDH. Fall off [facility] 87yo F hx of Alzheimer's, HTN, HLD comes to ED as transfer from [place] for SDH. Unwitnessed fall off sofa earlier, hit R side of head. CT showing R subdural hematoma measuring 0.4cm. Stable and neuro intact during EMs transport. Per grandson at bedside, pt is currently at baseline mental status. Moving all extremities. She lives at home with aid. Denies recently reported illnesses.

## 2021-08-29 NOTE — CONSULT NOTE ADULT - SUBJECTIVE AND OBJECTIVE BOX
p (1480)     HPI:  88F hx Alzheimer's, HTN, arthritis, pacemaker presents as xfer from . Pt fell from sitting position 2/2 dizziness w/o LOC. Not on AC/AP. Of note, patient is DNR/DNI and family does not want neurosurgical intervention.     Imaging:  Head CT: Traumatic right-sided frontal subarachnoid hemorrhage and relatively small subdural hemorrhage measuring up to 0.4 cm in thickness. No significant mass effect or midline shift at this time. Recommend short-term follow-up imaging.    Exam:  AOx1-2, PERRLA, EOMI, no facial, unable to assess for drift 2/2 right arm pain from fall, CANDACE bi 4 tri 4-, L HG 4+/5, RU delt 3/5 HG 4+/5, bi 4, tri 4-, LLE 4+/5 prox 5/5 dist, RLE 4+/5 prox, 5/5 dist. SILT    --Anticoagulation:    =====================  PAST MEDICAL HISTORY     PAST SURGICAL HISTORY         MEDICATIONS:  Antibiotics:    Neuro:  levETIRAcetam  IVPB 500 milliGRAM(s) IV Intermittent once    Other:      SOCIAL HISTORY:   Occupation:   Marital Status:     FAMILY HISTORY:      ROS: Negative except per HPI    LABS:                          13.9   9.18  )-----------( 271      ( 29 Aug 2021 12:39 )             41.6     08-29    138  |  104  |  9   ----------------------------<  103<H>  3.9   |  28  |  0.62    Ca    8.9      29 Aug 2021 12:39  Mg     2.3     08-29    TPro  7.3  /  Alb  3.2<L>  /  TBili  0.4  /  DBili  x   /  AST  15  /  ALT  14  /  AlkPhos  55  08-29

## 2021-08-29 NOTE — ED ADULT NURSE NOTE - OBJECTIVE STATEMENT
pt is here for fall.  As per family member, tripped and fell down to forward, right forehead to bruises with edema, c/o right arm pain, h/x dementia, denied chest pain or sob,

## 2021-08-29 NOTE — ED PROVIDER NOTE - CLINICAL SUMMARY MEDICAL DECISION MAKING FREE TEXT BOX
elderly pt w/ Alzheimer's here for subdural bleed after unwitnessed fall. VSS. Exam w/ intact neuro. NSX aware, rpt CT. Pt is DNR/DNI, will consider disposition pending CT. Possibly Input syncope work up.

## 2021-08-29 NOTE — ED PROVIDER NOTE - CRITICAL CARE ATTENDING CONTRIBUTION TO CARE
potentially imminent life threatening condition  consultation with specialists  d/w family on condition

## 2021-08-29 NOTE — ED PROVIDER NOTE - OBJECTIVE STATEMENT
87 yo F h/o Alzheimers, HTN, HLD p/w unwitnessed fall off sofa this AM. Pt at baseline mental status per son. Ambulatory. No vomiting. Unknown LOC. COVID vaccine utd.

## 2021-08-29 NOTE — ED PROVIDER NOTE - ATTENDING CONTRIBUTION TO CARE
Patient transferred [facility]    Fall OOB this morning.  History of dementia, per family having decline recently.  Cause of fall unknown.  Complaining of headache improved with pain medications, also complaining of pain in R shoulder (injured during fall).  [facility] noted acute SAH/SDH.  Not on AC.  Disoriented (baseline) otherwise neuro intact.  Point tender along R shoulder without bony point deformity.  Will give keppra in Emergency Department for seizure prophlaxis, consult neurosurgery, likely interval scan for evolution, dispo TBD.  DNR/DNI, discussed goals of care with grandson in Emergency Department, remains DNR/DNI, family requesting no surgical interventions.

## 2021-08-29 NOTE — ED ADULT NURSE REASSESSMENT NOTE - NS ED NURSE REASSESS COMMENT FT1
see yellow paper flowsheet for neuro checks; no change in status; await bed placement; grandson at bedside; calm; resting in bed; oriented to name and place

## 2021-08-29 NOTE — ED ADULT NURSE NOTE - OBJECTIVE STATEMENT
87 yo F transferred from [facility] for SAH and SDH s/p fall this morning. 89 yo F with hx of dementia, HTN, PPM transferred from [facility] for SAH and SDH s/p fall this morning. Pt lives at home alone with a 24hr HHA. Aides reports patient falling from her couch, witnessed, +head injury and R shoulder pain. pt is a&ox2 on arrival, which is baseline per grandson. Moves all extremities without difficulty, PERRL, equal  strength b/l , no slurred speech, no vision changes, no facial asymmetry, no arm drift, equal strength in all four extremities, no numbness or tingling. v paced on CCM. VSS on arrival.

## 2021-08-30 DIAGNOSIS — Z98.49 CATARACT EXTRACTION STATUS, UNSPECIFIED EYE: Chronic | ICD-10-CM

## 2021-08-30 DIAGNOSIS — Z95.0 PRESENCE OF CARDIAC PACEMAKER: Chronic | ICD-10-CM

## 2021-08-30 DIAGNOSIS — M19.90 UNSPECIFIED OSTEOARTHRITIS, UNSPECIFIED SITE: ICD-10-CM

## 2021-08-30 DIAGNOSIS — S06.5X9A TRAUMATIC SUBDURAL HEMORRHAGE WITH LOSS OF CONSCIOUSNESS OF UNSPECIFIED DURATION, INITIAL ENCOUNTER: ICD-10-CM

## 2021-08-30 DIAGNOSIS — I10 ESSENTIAL (PRIMARY) HYPERTENSION: ICD-10-CM

## 2021-08-30 DIAGNOSIS — Z86.79 PERSONAL HISTORY OF OTHER DISEASES OF THE CIRCULATORY SYSTEM: ICD-10-CM

## 2021-08-30 DIAGNOSIS — G30.9 ALZHEIMER'S DISEASE, UNSPECIFIED: ICD-10-CM

## 2021-08-30 LAB
A1C WITH ESTIMATED AVERAGE GLUCOSE RESULT: 5.8 % — HIGH (ref 4–5.6)
ALBUMIN SERPL ELPH-MCNC: 3.7 G/DL — SIGNIFICANT CHANGE UP (ref 3.3–5)
ALP SERPL-CCNC: 54 U/L — SIGNIFICANT CHANGE UP (ref 40–120)
ALT FLD-CCNC: 7 U/L — LOW (ref 10–45)
ANION GAP SERPL CALC-SCNC: 10 MMOL/L — SIGNIFICANT CHANGE UP (ref 5–17)
APTT BLD: 32.1 SEC — SIGNIFICANT CHANGE UP (ref 27.5–35.5)
AST SERPL-CCNC: 12 U/L — SIGNIFICANT CHANGE UP (ref 10–40)
BILIRUB SERPL-MCNC: 0.6 MG/DL — SIGNIFICANT CHANGE UP (ref 0.2–1.2)
BUN SERPL-MCNC: 8 MG/DL — SIGNIFICANT CHANGE UP (ref 7–23)
CALCIUM SERPL-MCNC: 9.2 MG/DL — SIGNIFICANT CHANGE UP (ref 8.4–10.5)
CHLORIDE SERPL-SCNC: 102 MMOL/L — SIGNIFICANT CHANGE UP (ref 96–108)
CHOLEST SERPL-MCNC: 187 MG/DL — SIGNIFICANT CHANGE UP
CO2 SERPL-SCNC: 26 MMOL/L — SIGNIFICANT CHANGE UP (ref 22–31)
COVID-19 SPIKE DOMAIN AB INTERP: POSITIVE
COVID-19 SPIKE DOMAIN ANTIBODY RESULT: >250 U/ML — HIGH
CREAT SERPL-MCNC: 0.57 MG/DL — SIGNIFICANT CHANGE UP (ref 0.5–1.3)
ESTIMATED AVERAGE GLUCOSE: 120 MG/DL — HIGH (ref 68–114)
GLUCOSE SERPL-MCNC: 91 MG/DL — SIGNIFICANT CHANGE UP (ref 70–99)
HCT VFR BLD CALC: 40.1 % — SIGNIFICANT CHANGE UP (ref 34.5–45)
HDLC SERPL-MCNC: 80 MG/DL — SIGNIFICANT CHANGE UP
HGB BLD-MCNC: 13 G/DL — SIGNIFICANT CHANGE UP (ref 11.5–15.5)
INR BLD: 1 RATIO — SIGNIFICANT CHANGE UP (ref 0.88–1.16)
LIPID PNL WITH DIRECT LDL SERPL: 93 MG/DL — SIGNIFICANT CHANGE UP
MCHC RBC-ENTMCNC: 30 PG — SIGNIFICANT CHANGE UP (ref 27–34)
MCHC RBC-ENTMCNC: 32.4 GM/DL — SIGNIFICANT CHANGE UP (ref 32–36)
MCV RBC AUTO: 92.6 FL — SIGNIFICANT CHANGE UP (ref 80–100)
NON HDL CHOLESTEROL: 107 MG/DL — SIGNIFICANT CHANGE UP
NRBC # BLD: 0 /100 WBCS — SIGNIFICANT CHANGE UP (ref 0–0)
PLATELET # BLD AUTO: 255 K/UL — SIGNIFICANT CHANGE UP (ref 150–400)
POTASSIUM SERPL-MCNC: 3.4 MMOL/L — LOW (ref 3.5–5.3)
POTASSIUM SERPL-SCNC: 3.4 MMOL/L — LOW (ref 3.5–5.3)
PROT SERPL-MCNC: 6.4 G/DL — SIGNIFICANT CHANGE UP (ref 6–8.3)
PROTHROM AB SERPL-ACNC: 12 SEC — SIGNIFICANT CHANGE UP (ref 10.6–13.6)
RBC # BLD: 4.33 M/UL — SIGNIFICANT CHANGE UP (ref 3.8–5.2)
RBC # FLD: 12.6 % — SIGNIFICANT CHANGE UP (ref 10.3–14.5)
SARS-COV-2 IGG+IGM SERPL QL IA: >250 U/ML — HIGH
SARS-COV-2 IGG+IGM SERPL QL IA: POSITIVE
SARS-COV-2 RNA SPEC QL NAA+PROBE: SIGNIFICANT CHANGE UP
SODIUM SERPL-SCNC: 138 MMOL/L — SIGNIFICANT CHANGE UP (ref 135–145)
TRIGL SERPL-MCNC: 73 MG/DL — SIGNIFICANT CHANGE UP
WBC # BLD: 8.21 K/UL — SIGNIFICANT CHANGE UP (ref 3.8–10.5)
WBC # FLD AUTO: 8.21 K/UL — SIGNIFICANT CHANGE UP (ref 3.8–10.5)

## 2021-08-30 PROCEDURE — 73200 CT UPPER EXTREMITY W/O DYE: CPT | Mod: 26,RT

## 2021-08-30 PROCEDURE — 93280 PM DEVICE PROGR EVAL DUAL: CPT | Mod: 26

## 2021-08-30 PROCEDURE — 99222 1ST HOSP IP/OBS MODERATE 55: CPT | Mod: GC

## 2021-08-30 PROCEDURE — 76377 3D RENDER W/INTRP POSTPROCES: CPT | Mod: 26

## 2021-08-30 PROCEDURE — 12345: CPT | Mod: NC

## 2021-08-30 PROCEDURE — 70450 CT HEAD/BRAIN W/O DYE: CPT | Mod: 26

## 2021-08-30 PROCEDURE — 73080 X-RAY EXAM OF ELBOW: CPT | Mod: 26,RT

## 2021-08-30 PROCEDURE — 99223 1ST HOSP IP/OBS HIGH 75: CPT | Mod: GC

## 2021-08-30 RX ORDER — LISINOPRIL 2.5 MG/1
1 TABLET ORAL
Qty: 0 | Refills: 0 | DISCHARGE

## 2021-08-30 RX ORDER — DICLOFENAC SODIUM 30 MG/G
2 GEL TOPICAL THREE TIMES A DAY
Refills: 0 | Status: DISCONTINUED | OUTPATIENT
Start: 2021-08-30 | End: 2021-08-31

## 2021-08-30 RX ORDER — ACETAMINOPHEN 500 MG
650 TABLET ORAL
Qty: 0 | Refills: 0 | DISCHARGE

## 2021-08-30 RX ORDER — ERGOCALCIFEROL 1.25 MG/1
1 CAPSULE ORAL
Qty: 0 | Refills: 0 | DISCHARGE

## 2021-08-30 RX ORDER — DONEPEZIL HYDROCHLORIDE 10 MG/1
10 TABLET, FILM COATED ORAL AT BEDTIME
Refills: 0 | Status: DISCONTINUED | OUTPATIENT
Start: 2021-08-30 | End: 2021-08-31

## 2021-08-30 RX ORDER — POTASSIUM CHLORIDE 20 MEQ
40 PACKET (EA) ORAL ONCE
Refills: 0 | Status: COMPLETED | OUTPATIENT
Start: 2021-08-30 | End: 2021-08-30

## 2021-08-30 RX ORDER — DONEPEZIL HYDROCHLORIDE 10 MG/1
1 TABLET, FILM COATED ORAL
Qty: 0 | Refills: 0 | DISCHARGE

## 2021-08-30 RX ORDER — ACETAMINOPHEN 500 MG
650 TABLET ORAL EVERY 6 HOURS
Refills: 0 | Status: DISCONTINUED | OUTPATIENT
Start: 2021-08-30 | End: 2021-08-31

## 2021-08-30 RX ORDER — LISINOPRIL 2.5 MG/1
10 TABLET ORAL DAILY
Refills: 0 | Status: DISCONTINUED | OUTPATIENT
Start: 2021-08-30 | End: 2021-08-31

## 2021-08-30 RX ORDER — RISPERIDONE 4 MG/1
0.5 TABLET ORAL
Refills: 0 | Status: DISCONTINUED | OUTPATIENT
Start: 2021-08-30 | End: 2021-08-31

## 2021-08-30 RX ORDER — RISPERIDONE 4 MG/1
1 TABLET ORAL
Qty: 0 | Refills: 0 | DISCHARGE

## 2021-08-30 RX ADMIN — DICLOFENAC SODIUM 2 GRAM(S): 30 GEL TOPICAL at 05:30

## 2021-08-30 RX ADMIN — Medication 40 MILLIEQUIVALENT(S): at 10:16

## 2021-08-30 RX ADMIN — DICLOFENAC SODIUM 2 GRAM(S): 30 GEL TOPICAL at 21:59

## 2021-08-30 RX ADMIN — LEVETIRACETAM 500 MILLIGRAM(S): 250 TABLET, FILM COATED ORAL at 05:30

## 2021-08-30 RX ADMIN — LEVETIRACETAM 500 MILLIGRAM(S): 250 TABLET, FILM COATED ORAL at 18:45

## 2021-08-30 RX ADMIN — DONEPEZIL HYDROCHLORIDE 10 MILLIGRAM(S): 10 TABLET, FILM COATED ORAL at 21:58

## 2021-08-30 RX ADMIN — RISPERIDONE 0.5 MILLIGRAM(S): 4 TABLET ORAL at 18:45

## 2021-08-30 RX ADMIN — LISINOPRIL 10 MILLIGRAM(S): 2.5 TABLET ORAL at 05:29

## 2021-08-30 RX ADMIN — RISPERIDONE 0.5 MILLIGRAM(S): 4 TABLET ORAL at 05:30

## 2021-08-30 NOTE — CONSULT NOTE ADULT - PROBLEM SELECTOR RECOMMENDATION 3
HCP and MOLST form already completed in the outpatient setting. HCP is her Daughter, Sara [phone]. She is SUNG. She already has 24/7 HHA

## 2021-08-30 NOTE — CONSULT NOTE ADULT - ATTENDING COMMENTS
87yo female with Alzheimer's dementia, complete heart block s/p pacemaker 5/2021, HLD, HTN, arthritis transferred from [place] for subdural hematoma from a fall. Patient has remained neurovascularly intact, no FND, and clinical course has been stable.    Fall precaution and PT>     ACP: HCP and MOLST forms were already completed in the outpatient setting. HCP is her Daughter, Sara [phone]. She is DRKRYSTINA. She already has 24/7 HHA. Will reach out to her PMD to have her inputs regarding hospice care at this point or at a later time.     No acute symptoms.     Will sign off.         Hever Cantor MD   Geriatrics and Palliative Care (GAP) Consult Service    of Geriatric and Palliative Medicine  [facility]      Please page the following number for clinical matters between the hours of 9 am and 5 pm from Monday through Friday : [phone]    After 5pm and on weekends, please see the contact information below:    In the event of newly developing, evolving, or worsening symptoms, please contact the Palliative Medicine team via pager (if the patient is at [facility] #[phone] or if the patient is at [facility] #[phone]) The Geriatric and Palliative Medicine service has coverage 24 hours a day/ 7 days a week to provide medical recommendations regarding symptom management needs via telephone 87yo female with Alzheimer's dementia, complete heart block s/p pacemaker 5/2021, HLD, HTN, arthritis transferred from [place] for subdural hematoma from a fall. Patient has remained neurovascularly intact, no FND, and clinical course has been stable.    Fall precaution and PT>     ACP: HCP and MOLST forms were already completed in the outpatient setting. HCP is her Daughter, Sara [phone]. She is SUNG. She already has 24/7 HHA. Will reach out to her PMD to have her inputs regarding hospice care at this point or at a later time.     No acute symptoms.     Will f/u for resources (? Hospice)         Hever Cantor MD   Geriatrics and Palliative Care (GAP) Consult Service    of Geriatric and Palliative Medicine  [facility]      Please page the following number for clinical matters between the hours of 9 am and 5 pm from Monday through Friday : [phone]    After 5pm and on weekends, please see the contact information below:    In the event of newly developing, evolving, or worsening symptoms, please contact the Palliative Medicine team via pager (if the patient is at [facility] #[phone] or if the patient is at [facility] #[phone]) The Geriatric and Palliative Medicine service has coverage 24 hours a day/ 7 days a week to provide medical recommendations regarding symptom management needs via telephone

## 2021-08-30 NOTE — CHART NOTE - NSCHARTNOTEFT_GEN_A_CORE
Pt with swelling of R shoulder with limited ROM. Discussed with orthopedic. Advised to obtain  CT of R shoulder. Attending notified.

## 2021-08-30 NOTE — H&P ADULT - ASSESSMENT
88F PMH of Alzheimer's dementia, complete heart block s/p pacemaker 5/2021, HLD, HTN, arthritis transferred from [place] for subdural hematoma from a fall. No LOC, no change in baseline mental status, hematoma on repeat CTH unchanged.

## 2021-08-30 NOTE — H&P ADULT - NSHPLABSRESULTS_GEN_ALL_CORE
08-29    138  |  104  |  9   ----------------------------<  103<H>  3.9   |  28  |  0.62    Ca    8.9      29 Aug 2021 12:39  Mg     2.3     08-29    TPro  7.3  /  Alb  3.2<L>  /  TBili  0.4  /  DBili  x   /  AST  15  /  ALT  14  /  AlkPhos  55  08-29                          13.9   9.18  )-----------( 271      ( 29 Aug 2021 12:39 )             41.6     BNP 1470    < from: CT Head No Cont (08.29.21 @ 17:03) >    EXAM:  CT BRAIN                            PROCEDURE DATE:  08/29/2021            INTERPRETATION:  Clinical history: Subdural hematomas    Multiple axial sections were performed from base of vertex without contrast enhancement. Coronal and sagittalinstructions were performed while    This exam is compared with prior head CT performed earlier same day at 1:04 PM.    Parenchymal volume loss is again seen.    Prominence of bifrontal extra space is again seen    Acute right frontal subarachnoid hemorrhage and right-sided subdural hematoma is again seen and unchanged.    Parenchymal volume loss and chronic microvascular ischemic change again seen    Evaluation of the osseous structures with appropriate window appears unchanged.    The visualized paranasal sinuses mastoid eminence fracture.    IMPRESSION: No significant change when allowing for differences in    --- End of Report ---        < end of copied text >    < from: CT Cervical Spine No Cont (08.29.21 @ 13:06) >    CT CERVICAL SPINE:    TECHNIQUE:  Axial images were obtained through the cervical spine using multislice helical technique.  Reformatted coronal and sagittal images were performed.    COMPARISON EXAMINATION:  None available at this time.    FINDINGS:  On the sagittal reformations, there is no prevertebral soft tissue swelling. There is no splaying of the spinous processes.  On the coronal reformations, occipital condyles are normal. Lateral masses of C1 align normally with C2.  On the axial images, no lucent fracture line is identified.    Multilevel degenerative osteoarthritis is present. Findings include marginal osteophytes, uncovertebral spurring, and facet joint space compartmentnarrowing with subchondral sclerosis and hypertrophic osteophytes at multiple levels. There is multilevel degenerative disc disease. Findings include loss of normal disc space height and endplate sclerosis.    Miscellaneous:  Suspected pacemaker wires. Correlate clinically. Calcified granuloma left lung apex. Heterogeneous thyroid gland.    IMPRESSIONS:    Head CT: Traumatic right-sided frontal subarachnoid hemorrhage and relatively small subdural hemorrhage measuring up to 0.4 cm in thickness. No significant mass effect or midline shift at this time. Recommend short-term follow-up imaging.    C-spine CT:  No acute fracture.    Discussed with Dr. Carbone in the ED at 1:15 PM.    --- End of Report ---      X ray two views: no fractures

## 2021-08-30 NOTE — H&P ADULT - NSHPREVIEWOFSYSTEMS_GEN_ALL_CORE
ROS limited due to patient's dementia    CONSTITUTIONAL: No fevers or chills  EYES/ENT: No visual changes;  No dizziness  NECK: No pain or stiffness  RESPIRATORY: No cough, hemoptysis; No shortness of breath  CARDIOVASCULAR: No chest pain   GASTROINTESTINAL: No abdominal or epigastric pain. No vomiting, or hematemesis; No diarrhea or constipation. No melena or hematochezia.  GENITOURINARY: No dysuria, no hematuria  NEUROLOGICAL: No numbness or weakness  SKIN: No itching, rashes

## 2021-08-30 NOTE — H&P ADULT - PROBLEM SELECTOR PLAN 1
Repeat CTH unchanged, baseline mental status. Family and HCP does not want neurosurgical interventions.  -Repeat CTH am  -neurochecks q 4hours  -Not on AC and holding DVT prophylaxis, INR 1.04  -C/w Keppra 500mg BID for 7 days  -Appreciate neurosurg recs  -R shoulder pain after fall, no fractures on x ray  -R elbow pain after fall, full ROM, no point tenderness  -HA after fall resolved with tylenol, continue to monitor MSK pain and treat tylenol PRN Repeat CTH unchanged, baseline mental status. Family and HCP does not want neurosurgical interventions.  -Repeat CTH am  -neurochecks q 4hours  -Not on AC and holding DVT prophylaxis, INR 1.04  -C/w Keppra 500mg BID for 7 days  -Appreciate neurosurg recs  -R shoulder pain after fall, no fractures on x ray  -R elbow pain after fall, full ROM, no point tenderness, no ecchymosis  -HA after fall resolved with tylenol, continue to monitor MSK pain and treat tylenol PRN Repeat CTH unchanged, baseline mental status. Family and HCP does not want neurosurgical interventions.  -Repeat CTH am  -neurochecks q 4hours  -Not on AC and holding DVT prophylaxis, INR 1.04  -C/w Keppra 500mg BID for 7 days  -Appreciate neurosurg recs  -R shoulder pain after fall, no fractures on x ray  -R elbow pain after fall, full ROM, no point tenderness, no ecchymosis. F/u R elbow x ray  -PT eval  -HA after fall resolved with tylenol, continue to monitor MSK pain and treat tylenol PRN

## 2021-08-30 NOTE — H&P ADULT - HISTORY OF PRESENT ILLNESS
88F PMH of Alzheimer's dementia, complete heart block s/p pacemaker 5/2021, HLD, HTN, arthritis transferred from [place] for subdural hematoma from a fall. Pt fell from couch this am, no LOC, no emesis, no change from baseline mental status. Pt reported some R shoulder pain after fall. Two head CT's noncon 4 hours apart showed traumatic right-sided frontal subarachnoid hemorrhage and relatively small subdural hemorrhage measuring up to 0.4 cm in thickness. No significant mass effect or midline shift. X ray of shoulder was normal, no fractures.    Of note, patient had fall earlier this year 4/2021 while getting out of the car. Treated at [facility] for broken nose, and also found to have complete heart block requiring pacemaker placement. Patient does not use any aid to walk, lives at home with 24 hour HHA who helps with mobility. Insurance issues getting a walker    Patient diagnosed with Alzheimers ~2017, at baseline knows her name and recognizes family member's faces but not their names. Able to communicate in [place]ian. Endorses pain on R temple after fall, pain on R elbow. Denies chest pain, SOB, diarrhea, constipation.    ED:  155/73 HR 70, RR 18, 94% RA  Recieved keppra 500mg, CTH, CT spine, R shoulder x ray

## 2021-08-30 NOTE — PHYSICAL THERAPY INITIAL EVALUATION ADULT - PERTINENT HX OF CURRENT PROBLEM, REHAB EVAL
88F PMH of Alzheimer's dementia, complete heart block s/p pacemaker 5/2021, HLD, HTN, arthritis transferred from [place] for subdural hematoma from a fall. No LOC, no change in baseline mental status, hematoma on repeat CTH unchanged.

## 2021-08-30 NOTE — H&P ADULT - PROBLEM SELECTOR PLAN 4
Pt at baseline mental status, oriented to self  -C/w donepezil 10mg qd  -Per daughter, in the process of weaning off risperidone, currently on 0.5mg BID  -1:1 for fall risk -C/w home lisinopril 10mg qd

## 2021-08-30 NOTE — CONSULT NOTE ADULT - PROBLEM SELECTOR RECOMMENDATION 9
- Interval CT scan pending  - Per primary team, patient may be discharged home tomorrow if imaging remains without interval change  - continue with neurochecks  - Pt denies acute pain at this time, will continue symptomatic treatment   - dispo: home with home PT

## 2021-08-30 NOTE — CHART NOTE - NSCHARTNOTEFT_GEN_A_CORE
[facility] Division of Hospital Medicine  Leyla Up MD  Pager (M-F, 8A-5P): [phone]  Other Times:  [phone]      SUBJECTIVE / OVERNIGHT EVENTS: Pt seen and examined this morning at bedside with . She denies any pain or blurry vision. She has been trying to get out of bed frequently. Per family, no NSG interventions. Repeat CT head noted.   ADDITIONAL REVIEW OF SYSTEMS: 11 pt review of systems negative.     MEDICATIONS  (STANDING):  diclofenac sodium 1% Gel 2 Gram(s) Topical three times a day  donepezil 10 milliGRAM(s) Oral at bedtime  levETIRAcetam 500 milliGRAM(s) Oral two times a day  lisinopril 10 milliGRAM(s) Oral daily  risperiDONE   Tablet 0.5 milliGRAM(s) Oral two times a day    MEDICATIONS  (PRN):  acetaminophen   Tablet .. 650 milliGRAM(s) Oral every 6 hours PRN Mild Pain (1 - 3), Moderate Pain (4 - 6)      I&O's Summary      PHYSICAL EXAM:  Vital Signs Last 24 Hrs  T(C): 36.7 (30 Aug 2021 14:41), Max: 37.1 (30 Aug 2021 06:09)  T(F): 98 (30 Aug 2021 14:41), Max: 98.7 (30 Aug 2021 06:09)  HR: 74 (30 Aug 2021 14:41) (60 - 99)  BP: 146/75 (30 Aug 2021 14:41) (141/76 - 167/86)  BP(mean): --  RR: 18 (30 Aug 2021 14:41) (18 - 18)  SpO2: 96% (30 Aug 2021 14:41) (95% - 98%)  CONSTITUTIONAL: NAD, well-developed, well-groomed  EYES: PERRLA; conjunctiva and sclera clear  ENMT: Moist oral mucosa, no pharyngeal injection or exudates; normal dentition  NECK: Supple, no palpable masses; no thyromegaly  RESPIRATORY: Normal respiratory effort; lungs are clear to auscultation bilaterally  CARDIOVASCULAR: Regular rate and rhythm, normal S1 and S2, no murmur/rub/gallop; No lower extremity edema; Peripheral pulses are 2+ bilaterally  ABDOMEN: Nontender to palpation, normoactive bowel sounds, no rebound/guarding; No hepatosplenomegaly  MUSCULOSKELETAL:  Normal gait; no clubbing or cyanosis of digits; Right shoulder TTP  PSYCH: A+O to person, place, and time; affect appropriate  NEUROLOGY: CN 2-12 are intact and symmetric; no gross sensory deficits   SKIN: No rashes; no palpable lesions    LABS:                        13.0   8.21  )-----------( 255      ( 30 Aug 2021 06:47 )             40.1     08-30    138  |  102  |  8   ----------------------------<  91  3.4<L>   |  26  |  0.57    Ca    9.2      30 Aug 2021 06:54  Mg     2.3     08-    TPro  6.4  /  Alb  3.7  /  TBili  0.6  /  DBili  x   /  AST  12  /  ALT  7<L>  /  AlkPhos  54  08-30    PT/INR - ( 30 Aug 2021 06:47 )   PT: 12.0 sec;   INR: 1.00 ratio         PTT - ( 30 Aug 2021 06:47 )  PTT:32.1 sec  CARDIAC MARKERS ( 29 Aug 2021 12:39 )  <0.015 ng/mL / x     / x     / x     / x          Urinalysis Basic - ( 29 Aug 2021 13:16 )    Color: Yellow / Appearance: Clear / S.010 / pH: x  Gluc: x / Ketone: Negative  / Bili: Negative / Urobili: Negative   Blood: x / Protein: Negative / Nitrite: Negative   Leuk Esterase: Negative / RBC: x / WBC x   Sq Epi: x / Non Sq Epi: x / Bacteria: x     Problem/Plan - 1:  ·  Problem: Subdural hematoma and SAH   ·  Plan: Repeat CTH unchanged, baseline mental status. Family and HCP does not want neurosurgical interventions.  -neurochecks q 4hours  -Not on AC and holding DVT prophylaxis, INR 1.04  -C/w Keppra 500mg BID for 7 days  -Appreciate neurosurg recs  -R shoulder pain after fall, no fractures on x ray but patient unable to lift arm. Consult Ortho re: brace.  -R elbow pain after fall, full ROM, no point tenderness, no ecchymosis.  xray negative for fracture.  -HA after fall resolved with tylenol, continue to monitor MSK pain and treat tylenol PRN.     Problem/Plan - 2: Fall, r/o arrythmia  ·  Problem: History of complete heart block.   ·  Plan: Hx of complete heart block per daughter, s/p pacemaker placement 2021  -Obtain outside records from [facility]  -Possible syncope leading to fall? but no witnessed loss of consciousness per HHA  -Interrogate pacemaker      Problem/Plan - 3:  ·  Problem: Arthritis.   ·  Plan: Most likely osteoarthritis  -Topical NSAIDS  -Cold compresses.     Problem/Plan - 4:  ·  Problem: Hypertension.   ·  Plan: -C/w home lisinopril 10mg qd.     Problem/Plan - 5:  ·  Problem: Alzheimer's dementia.   ·  Plan: Pt at baseline mental status, oriented to self  -C/w donepezil 10mg qd  -Per daughter, in the process of weaning off risperidone, currently on 0.5mg BID  -Fall precautions.  NSG on board.

## 2021-08-30 NOTE — CONSULT NOTE ADULT - SUBJECTIVE AND OBJECTIVE BOX
HPI:  88F PMH of Alzheimer's dementia, complete heart block s/p pacemaker 2021, HLD, HTN, arthritis transferred from [place] for subdural hematoma from a fall. Pt fell from couch this am, no LOC, no emesis, no change from baseline mental status. Pt reported some R shoulder pain after fall. Two head CT's noncon 4 hours apart showed traumatic right-sided frontal subarachnoid hemorrhage and relatively small subdural hemorrhage measuring up to 0.4 cm in thickness. No significant mass effect or midline shift. X ray of shoulder was normal, no fractures. Patient diagnosed with Alzheimers ~, at baseline knows her name and recognizes family member's faces but not their names. Able to communicate in Tristanian.     Presently, patient offers no acute complaints. Her clinical course has been stable thus far with no acute changes in mentation or HD status. She is awake, alert, and able to make needs known. Her niece is present at bedside today and she is veyr pleased with her aunt's progress. Patient lives at home in a  and has 24/ HHA. She has advanced dementia and has recently established care with the [facility] outpatient clinic for management. Patient's HCP is her daughter, Molly, who I spoke with today and provided update on stable clinical status. Molly is very hopeful for a smooth transition back home. PT has evaluated patient and she is ambulating with assist and recommends home PT. Patient offers no acute complaints today.     ED:  155/73 HR 70, RR 18, 94% RA  Recieved keppra 500mg, CTH, CT spine, R shoulder x ray   (30 Aug 2021 00:11)    PERTINENT PM/SXH:   Alzheimer&#x27;s dementia    Hypertension    History of complete heart block    Hyperlipidemia    Arthritis      Cardiac pacemaker    S/P cataract surgery      FAMILY HISTORY:    ITEMS NOT CHECKED ARE NOT PRESENT    SOCIAL HISTORY:   Significant other/partner[ ]  Children[X ]  Islam/Spirituality: Buddhism  Substance hx: N/A  Home Opioid hx: N/A  Living Situation: [x ]Home PH, no stairs to negotiate. Has has 2 prior falls per fmaily members.   ADVANCE DIRECTIVES:    DNR x MOLST  [x ]   DECISION MAKER(s):  [ x] Health Care Proxy(s)     Name(s): Phone Number(s): DaughterMolly [phone]    BASELINE (I)ADL(s) (prior to admission):  [place]: [ ]Total  [ ] Moderate [x ]Dependent    Allergies    No Known Allergies    Intolerances    MEDICATIONS  (STANDING):  diclofenac sodium 1% Gel 2 Gram(s) Topical three times a day  donepezil 10 milliGRAM(s) Oral at bedtime  levETIRAcetam 500 milliGRAM(s) Oral two times a day  lisinopril 10 milliGRAM(s) Oral daily  risperiDONE   Tablet 0.5 milliGRAM(s) Oral two times a day    MEDICATIONS  (PRN):  acetaminophen   Tablet .. 650 milliGRAM(s) Oral every 6 hours PRN Mild Pain (1 - 3), Moderate Pain (4 - 6)    PRESENT SYMPTOMS: [ ]Unable to obtain due to poor mentation   Source if other than patient:  [ ]Family   [ ]Team     Pain: [ ]yes [x ]no  Location - HA has resolved                      PAIN AD Score: 0    [email or web address] (press ctrl +  left click to view)    Dyspnea:                           none  Anxiety:                            denies  Fatigue:                             [ x]Mild [ ]Moderate [ ]Severe  Nausea:                             denies  Loss of appetite:              [ ]Mild [ x]Moderate [ ]Severe  Constipation:                    denies    Other Symptoms:  [x ]All other review of systems negative     Palliative Performance Status Version 2:        50 %    http://npcrc.org/files/news/palliative_performance_scale_ppsv2.pdf  PHYSICAL EXAM:  Vital Signs Last 24 Hrs  T(C): 36.7 (30 Aug 2021 14:41), Max: 37.1 (30 Aug 2021 06:09)  T(F): 98 (30 Aug 2021 14:41), Max: 98.7 (30 Aug 2021 06:09)  HR: 74 (30 Aug 2021 14:41) (60 - 99)  BP: 146/75 (30 Aug 2021 14:41) (141/76 - 167/86)  BP(mean): --  RR: 18 (30 Aug 2021 14:41) (18 - 18)  SpO2: 96% (30 Aug 2021 14:41) (95% - 98%) I&O's Summary    GENERAL:  [x ]Alert  [ ]Oriented x   [ ]Lethargic  [ ]Cachexia  [ ]Unarousable  [x ]Verbal  [ ]Non-Verbal  Behavioral: no self-harming behaviors noted  HEENT: right temporal and frontal hematoma   PULMONARY:   [x ]Clear [ ]Tachypnea  [ ]Audible excessive secretions   no wheezes, rales or rhonchi  CARDIOVASCULAR:    [x ]Regular [ ]Irregular [ ]Tachy  [ ]Carlos [ ]Murmur [ ]Other  GASTROINTESTINAL:  [x ]Soft  [ ]Distended   [ x]+BS  [ x]Non tender [ ]Tender  [ ]PEG [ ]OGT/ NGT  Last BM:   MUSCULOSKELETAL:   [x ]Normal   [ ]Weakness  [ ]Bed/Wheelchair bound [ ]Edema  NEUROLOGIC:   x[ ]No focal deficits  [ ]Cognitive impairment  [ ]Dysphagia [ ]Dysarthria [ ]Paresis [ ]Other   SKIN:   [x ]Normal    [ ]Rash  [ ]Pressure ulcer(s)       Present on admission [ ]y [ ]n      LABS:                        13.0   8.21  )-----------( 255      ( 30 Aug 2021 06:47 )             40.1   08-30    138  |  102  |  8   ----------------------------<  91  3.4<L>   |  26  |  0.57    Ca    9.2      30 Aug 2021 06:54  Mg     2.3     08-29    TPro  6.4  /  Alb  3.7  /  TBili  0.6  /  DBili  x   /  AST  12  /  ALT  7<L>  /  AlkPhos  54  08-30  PT/INR - ( 30 Aug 2021 06:47 )   PT: 12.0 sec;   INR: 1.00 ratio         PTT - ( 30 Aug 2021 06:47 )  PTT:32.1 sec    Urinalysis Basic - ( 29 Aug 2021 13:16 )    Color: Yellow / Appearance: Clear / S.010 / pH: x  Gluc: x / Ketone: Negative  / Bili: Negative / Urobili: Negative   Blood: x / Protein: Negative / Nitrite: Negative   Leuk Esterase: Negative / RBC: x / WBC x   Sq Epi: x / Non Sq Epi: x / Bacteria: x      RADIOLOGY & ADDITIONAL STUDIES:    PROTEIN CALORIE MALNUTRITION PRESENT: [ ]mild [ ]moderate [ ]severe [ ]underweight [ ]morbid obesity  https://www.andeal.org/vault/1660/web/files/ONC/Table_Clinical%20Characteristics%20to%20Document%20Malnutrition-White%20JV%20et%20al%2020.pdf      Weight (kg): 43.5 (21 @ 15:29), 43.5 (21 @ 11:18)    [ ]PPSV2 < or = to 30% [ ]significant weight loss  [ ]poor nutritional intake  [ ]anasarca      [ ]Artificial Nutrition      REFERRALS:   [ ]Chaplaincy  [ ]Hospice  [ ]Child Life  [ ]Social Work  [ ]Case management [ ]Holistic Therapy     Goals of Care Document: MOLST HPI:  88F PMH of Alzheimer's dementia, complete heart block s/p pacemaker 2021, HLD, HTN, arthritis transferred from [place] for subdural hematoma from a fall. Pt fell from couch this am, no LOC, no emesis, no change from baseline mental status. Pt reported some R shoulder pain after fall. Two head CT's noncon 4 hours apart showed traumatic right-sided frontal subarachnoid hemorrhage and relatively small subdural hemorrhage measuring up to 0.4 cm in thickness. No significant mass effect or midline shift. X ray of shoulder was normal, no fractures. Patient diagnosed with Alzheimers ~, at baseline knows her name and recognizes family member's faces but not their names. Able to communicate in Irish.     Presently, patient offers no acute complaints. Her clinical course has been stable thus far with no acute changes in mentation or HD status. She is awake, alert, and able to make needs known. Her niece is present at bedside today and she is veyr pleased with her aunt's progress. Patient lives at home in a  and has 24/ HHA. She has advanced dementia and has recently established care with the Geriatric and Palliative outpatient clinic for management. Patient's HCP is her daughter, Sara, who I spoke with today and provided update on stable clinical status. Molly is very hopeful for a smooth transition back home. PT has evaluated patient and she is ambulating with assist and recommends home PT. Patient offers no acute complaints today.     ED:  155/73 HR 70, RR 18, 94% RA  Recieved keppra 500mg, CTH, CT spine, R shoulder x ray   (30 Aug 2021 00:11)    PERTINENT PM/SXH:   Alzheimer&#x27;s dementia    Hypertension    History of complete heart block    Hyperlipidemia    Arthritis      Cardiac pacemaker    S/P cataract surgery      FAMILY HISTORY:    ITEMS NOT CHECKED ARE NOT PRESENT    SOCIAL HISTORY:   Significant other/partner[ ]  Children[X ]  Jehovah's witness/Spirituality: Voodoo  Substance hx: N/A  Home Opioid hx: N/A  Living Situation: [x ]Home PH, no stairs to negotiate. Has has 2 prior falls per fmaily members.   ADVANCE DIRECTIVES:    DNR x MOLST  [x ]   DECISION MAKER(s):  [ x] Health Care Proxy(s)     Name(s): Phone Number(s): DaughterSara  [phone]    BASELINE (I)ADL(s) (prior to admission):  [place]: [ ]Total  [ ] Moderate [x ]Dependent    Allergies    No Known Allergies    Intolerances    MEDICATIONS  (STANDING):  diclofenac sodium 1% Gel 2 Gram(s) Topical three times a day  donepezil 10 milliGRAM(s) Oral at bedtime  levETIRAcetam 500 milliGRAM(s) Oral two times a day  lisinopril 10 milliGRAM(s) Oral daily  risperiDONE   Tablet 0.5 milliGRAM(s) Oral two times a day    MEDICATIONS  (PRN):  acetaminophen   Tablet .. 650 milliGRAM(s) Oral every 6 hours PRN Mild Pain (1 - 3), Moderate Pain (4 - 6)    PRESENT SYMPTOMS: [ ]Unable to obtain due to poor mentation   Source if other than patient:  [ ]Family   [ ]Team     Pain: [ ]yes [x ]no  Location - HA has resolved                      PAIN AD Score: 0    [email or web address] (press ctrl +  left click to view)    Dyspnea:                           none  Anxiety:                            denies  Fatigue:                             [ x]Mild [ ]Moderate [ ]Severe  Nausea:                             denies  Loss of appetite:              [ ]Mild [ x]Moderate [ ]Severe  Constipation:                    denies    Other Symptoms:  [x ]All other review of systems negative     Palliative Performance Status Version 2:        40 %    http://npcrc.org/files/news/palliative_performance_scale_ppsv2.pdf  PHYSICAL EXAM:  Vital Signs Last 24 Hrs  T(C): 36.7 (30 Aug 2021 14:41), Max: 37.1 (30 Aug 2021 06:09)  T(F): 98 (30 Aug 2021 14:41), Max: 98.7 (30 Aug 2021 06:09)  HR: 74 (30 Aug 2021 14:41) (60 - 99)  BP: 146/75 (30 Aug 2021 14:41) (141/76 - 167/86)  BP(mean): --  RR: 18 (30 Aug 2021 14:41) (18 - 18)  SpO2: 96% (30 Aug 2021 14:41) (95% - 98%) I&O's Summary    GENERAL:  [x ]Alert  [ ]Oriented x   [ ]Lethargic  [ ]Cachexia  [ ]Unarousable  [x ]Verbal  [ ]Non-Verbal  Behavioral: no self-harming behaviors noted  HEENT: right temporal and frontal hematoma   PULMONARY:   [x ]Clear [ ]Tachypnea  [ ]Audible excessive secretions   no wheezes, rales or rhonchi  CARDIOVASCULAR:    [x ]Regular [ ]Irregular [ ]Tachy  [ ]Carlos [ ]Murmur [ ]Other  GASTROINTESTINAL:  [x ]Soft  [ ]Distended   [ x]+BS  [ x]Non tender [ ]Tender  [ ]PEG [ ]OGT/ NGT  Last BM:   MUSCULOSKELETAL:   [x ]Normal   [ ]Weakness  [ ]Bed/Wheelchair bound [ ]Edema  NEUROLOGIC:   x[ ]No focal deficits  [ ]Cognitive impairment  [ ]Dysphagia [ ]Dysarthria [ ]Paresis [ ]Other   SKIN:   [x ]Normal    [ ]Rash  [ ]Pressure ulcer(s)       Present on admission [ ]y [ ]n      LABS:                        13.0   8.21  )-----------( 255      ( 30 Aug 2021 06:47 )             40.1   08    138  |  102  |  8   ----------------------------<  91  3.4<L>   |  26  |  0.57    Ca    9.2      30 Aug 2021 06:54  Mg     2.3     08    TPro  6.4  /  Alb  3.7  /  TBili  0.6  /  DBili  x   /  AST  12  /  ALT  7<L>  /  AlkPhos  54  08-30  PT/INR - ( 30 Aug 2021 06:47 )   PT: 12.0 sec;   INR: 1.00 ratio         PTT - ( 30 Aug 2021 06:47 )  PTT:32.1 sec    Urinalysis Basic - ( 29 Aug 2021 13:16 )    Color: Yellow / Appearance: Clear / S.010 / pH: x  Gluc: x / Ketone: Negative  / Bili: Negative / Urobili: Negative   Blood: x / Protein: Negative / Nitrite: Negative   Leuk Esterase: Negative / RBC: x / WBC x   Sq Epi: x / Non Sq Epi: x / Bacteria: x      RADIOLOGY & ADDITIONAL STUDIES:    < from: CT Head No Cont (21 @ 13:24) >    EXAM:  CT BRAIN                            PROCEDURE DATE:  2021            INTERPRETATION:  Clinical indication: Fall.    Multiple axial sections were performed from base of skull to vertex without contrast enhancement. Coronal and sagittal obstructions were performed as well    This exam is compared to prior noncontrast head CT performed on 2021.    Prominence of bifrontal extra axial spaces again seen and unchanged. Also again seen is a acute on chronic subdural hematomas involving the right frontal parietal region. This finding measures approximately 1.3 cm in widest diameter and previously measured approximately 1.4 cm widest diameter.    Parenchymal volume loss and chronic microvascular ischemic changes again seen.    Acute subarachnoid hemorrhage involving the right frontal region is again seen and unchanged.    Evaluation of the osseous structures with the appropriate window appears unremarkable    The visualized paranasal sinuses and mastoid and middle ear regions appear clear.    IMPRESSION: No significant change when allowing for differences in technique.    --- End of Report ---                CARYN ROD MD; Attending Radiologist  This document has been electronically signed. Aug 30 2021  1:29PM    < end of copied text >      PROTEIN CALORIE MALNUTRITION PRESENT: [ ]mild [ ]moderate [ ]severe [ ]underweight [ ]morbid obesity  https://www.andeal.org/vault/2440/web/files/ONC/Table_Clinical%20Characteristics%20to%20Document%20Malnutrition-White%20JV%20et%20al%2020.pdf      Weight (kg): 43.5 (21 @ 15:29), 43.5 (21 @ 11:18)    [ ]PPSV2 < or = to 30% [ ]significant weight loss  [ ]poor nutritional intake  [ ]anasarca      [ ]Artificial Nutrition      REFERRALS:   [ ]Chaplaincy  [ ]Hospice  [ ]Child Life  [ ]Social Work  [ ]Case management [ ]Holistic Therapy     Goals of Care Document: MOLST

## 2021-08-30 NOTE — PHYSICAL THERAPY INITIAL EVALUATION ADULT - ADDITIONAL COMMENTS
PTA pt required assist/supervision with all functional mobility and ADLs w/o AD. Pt lives in a PH with her daughter and 24/7 Home health aide, no steps to negotiate

## 2021-08-30 NOTE — H&P ADULT - PROBLEM SELECTOR PLAN 2
Hx of complete heart block per daughter, s/p pacemaker placement 5/2021  -Obtain outside records from [facility]  -Possible syncope leading to fall? but no witnessed loss of consciousness per HHA  -F/u EKG  -pacemaker check

## 2021-08-30 NOTE — PROCEDURE NOTE - ADDITIONAL PROCEDURE DETAILS
Indication for Interrogation: Syncope  Presenting Rhythm: A-Paced / V-Paced 60-80s  Measured data WNL, normal device function, PPM dependent  V-Paced 100%, A-Paced 30%  Events since last interrogation (6/2021): none  Changes made: none  PPM working appropriately and no found source of syncope etiology  Discussed with primary team SUSAN Foster PA-C  [id number]

## 2021-08-30 NOTE — H&P ADULT - ATTENDING COMMENTS
88F PMH of Alzheimer's dementia, complete heart block s/p pacemaker 5/2021, HLD, HTN, arthritis  p/w fall to [place] found to have  Acute right frontal subarachnoid hemorrhage and right-sided subdural hematoma, t/f to [facility] for nSgy eval ,  patient A&ox1 ( baseline) ,abrasions on temporal region ,labs unremarkable , repeat CTH at 4hrs w/o acute change; will repeat CTH at 24hrs, cont Keppra for Szr ppx , can continue other home meds , can start DVT ppx if 24 hr CT stable , PT eval, may require placement , will obtain Xray of elbow , Patient DNR/DNI

## 2021-08-30 NOTE — CONSULT NOTE ADULT - ASSESSMENT
87yo female with pmhx of Alzheimer's dementia, complete heart block s/p pacemaker 5/2021, HLD, HTN, arthritis transferred from [place] for subdural hematoma from a fall. Patient has remained neurovascularly intact, no FND, and clinical course has been stable.
MIGUEL, SOPHIA  88F DNR/DNI hx Alzheimer's xfer [facility] for tSAH and SDH no AC/AP. Pt fell from sitting position d/t dizziness w/o LOC. CTH: RF tSAH, 0.4cm acute R SDH, no mass effect or MLS. Exam: AOx1-2 (baseline dementia), PERRLA, EOMI, no facial, unable to assess for drift 2/2 right arm pain from fall, CANDACE bi 4 tri 4-, L HG 4+/5, RU delt 3/5 HG 4+/5, bi 4, tri 4-, LLE 4+/5 prox 5/5 dist, RLE 4+/5 prox, 5/5 dist. SILT.  -Family does not want neurosurgical intervention  -Can obtain repeat 4h CTH (i.e. 5pm) for prognostic value, will not affect nsgy mgmt  -Can consider q4h neuro checks  -Check coags, plts,   -Hold all AC/AP  -Hold DVT ppx. Repeat CTH tomorrow. If stable, per trauma protocol, can start dvt ppx 24h after 24h stable CT.   -Keppra 500bid x 7d

## 2021-08-30 NOTE — CONSULT NOTE ADULT - PROBLEM SELECTOR RECOMMENDATION 2
- Patient has 24/7 HHA, this is necessary for safe transition back to community   - continue follow-up with outpatient Geriatrics PCP, Dr. Krueger

## 2021-08-31 ENCOUNTER — TRANSCRIPTION ENCOUNTER (OUTPATIENT)
Age: 86
End: 2021-08-31

## 2021-08-31 VITALS
SYSTOLIC BLOOD PRESSURE: 166 MMHG | TEMPERATURE: 98 F | HEART RATE: 60 BPM | OXYGEN SATURATION: 97 % | RESPIRATION RATE: 18 BRPM | DIASTOLIC BLOOD PRESSURE: 78 MMHG

## 2021-08-31 DIAGNOSIS — Z51.5 ENCOUNTER FOR PALLIATIVE CARE: ICD-10-CM

## 2021-08-31 LAB
ANION GAP SERPL CALC-SCNC: 12 MMOL/L — SIGNIFICANT CHANGE UP (ref 5–17)
BUN SERPL-MCNC: 9 MG/DL — SIGNIFICANT CHANGE UP (ref 7–23)
CALCIUM SERPL-MCNC: 9.5 MG/DL — SIGNIFICANT CHANGE UP (ref 8.4–10.5)
CHLORIDE SERPL-SCNC: 101 MMOL/L — SIGNIFICANT CHANGE UP (ref 96–108)
CO2 SERPL-SCNC: 26 MMOL/L — SIGNIFICANT CHANGE UP (ref 22–31)
CREAT SERPL-MCNC: 0.6 MG/DL — SIGNIFICANT CHANGE UP (ref 0.5–1.3)
GLUCOSE SERPL-MCNC: 83 MG/DL — SIGNIFICANT CHANGE UP (ref 70–99)
HCT VFR BLD CALC: 41.8 % — SIGNIFICANT CHANGE UP (ref 34.5–45)
HGB BLD-MCNC: 13.6 G/DL — SIGNIFICANT CHANGE UP (ref 11.5–15.5)
MCHC RBC-ENTMCNC: 30.4 PG — SIGNIFICANT CHANGE UP (ref 27–34)
MCHC RBC-ENTMCNC: 32.5 GM/DL — SIGNIFICANT CHANGE UP (ref 32–36)
MCV RBC AUTO: 93.3 FL — SIGNIFICANT CHANGE UP (ref 80–100)
NRBC # BLD: 0 /100 WBCS — SIGNIFICANT CHANGE UP (ref 0–0)
PLATELET # BLD AUTO: 245 K/UL — SIGNIFICANT CHANGE UP (ref 150–400)
POTASSIUM SERPL-MCNC: 3.9 MMOL/L — SIGNIFICANT CHANGE UP (ref 3.5–5.3)
POTASSIUM SERPL-SCNC: 3.9 MMOL/L — SIGNIFICANT CHANGE UP (ref 3.5–5.3)
RBC # BLD: 4.48 M/UL — SIGNIFICANT CHANGE UP (ref 3.8–5.2)
RBC # FLD: 12.7 % — SIGNIFICANT CHANGE UP (ref 10.3–14.5)
SODIUM SERPL-SCNC: 139 MMOL/L — SIGNIFICANT CHANGE UP (ref 135–145)
WBC # BLD: 7.66 K/UL — SIGNIFICANT CHANGE UP (ref 3.8–10.5)
WBC # FLD AUTO: 7.66 K/UL — SIGNIFICANT CHANGE UP (ref 3.8–10.5)

## 2021-08-31 PROCEDURE — 80053 COMPREHEN METABOLIC PANEL: CPT

## 2021-08-31 PROCEDURE — 85610 PROTHROMBIN TIME: CPT

## 2021-08-31 PROCEDURE — 96374 THER/PROPH/DIAG INJ IV PUSH: CPT

## 2021-08-31 PROCEDURE — 86769 SARS-COV-2 COVID-19 ANTIBODY: CPT

## 2021-08-31 PROCEDURE — 73200 CT UPPER EXTREMITY W/O DYE: CPT

## 2021-08-31 PROCEDURE — 99291 CRITICAL CARE FIRST HOUR: CPT | Mod: 25

## 2021-08-31 PROCEDURE — 99233 SBSQ HOSP IP/OBS HIGH 50: CPT

## 2021-08-31 PROCEDURE — 85027 COMPLETE CBC AUTOMATED: CPT

## 2021-08-31 PROCEDURE — 83036 HEMOGLOBIN GLYCOSYLATED A1C: CPT

## 2021-08-31 PROCEDURE — 80061 LIPID PANEL: CPT

## 2021-08-31 PROCEDURE — U0005: CPT

## 2021-08-31 PROCEDURE — 97161 PT EVAL LOW COMPLEX 20 MIN: CPT

## 2021-08-31 PROCEDURE — 73080 X-RAY EXAM OF ELBOW: CPT

## 2021-08-31 PROCEDURE — 76377 3D RENDER W/INTRP POSTPROCES: CPT

## 2021-08-31 PROCEDURE — U0003: CPT

## 2021-08-31 PROCEDURE — 85730 THROMBOPLASTIN TIME PARTIAL: CPT

## 2021-08-31 PROCEDURE — 80048 BASIC METABOLIC PNL TOTAL CA: CPT

## 2021-08-31 PROCEDURE — G1004: CPT

## 2021-08-31 PROCEDURE — 70450 CT HEAD/BRAIN W/O DYE: CPT

## 2021-08-31 RX ORDER — LEVOCETIRIZINE DIHYDROCHLORIDE 0.5 MG/ML
1 SOLUTION ORAL
Qty: 0 | Refills: 0 | DISCHARGE

## 2021-08-31 RX ORDER — ACETAMINOPHEN 500 MG
2 TABLET ORAL
Qty: 0 | Refills: 0 | DISCHARGE
Start: 2021-08-31

## 2021-08-31 RX ORDER — LEVETIRACETAM 250 MG/1
1 TABLET, FILM COATED ORAL
Qty: 0 | Refills: 0 | DISCHARGE
Start: 2021-08-31

## 2021-08-31 RX ORDER — DICLOFENAC SODIUM 30 MG/G
2 GEL TOPICAL
Qty: 0 | Refills: 0 | DISCHARGE
Start: 2021-08-31

## 2021-08-31 RX ORDER — TRAZODONE HCL 50 MG
0 TABLET ORAL
Qty: 0 | Refills: 0 | DISCHARGE

## 2021-08-31 RX ORDER — LEVETIRACETAM 250 MG/1
1 TABLET, FILM COATED ORAL
Qty: 11 | Refills: 0
Start: 2021-08-31 | End: 2021-09-05

## 2021-08-31 RX ADMIN — Medication 650 MILLIGRAM(S): at 14:09

## 2021-08-31 RX ADMIN — RISPERIDONE 0.5 MILLIGRAM(S): 4 TABLET ORAL at 05:31

## 2021-08-31 RX ADMIN — LISINOPRIL 10 MILLIGRAM(S): 2.5 TABLET ORAL at 05:31

## 2021-08-31 RX ADMIN — DICLOFENAC SODIUM 2 GRAM(S): 30 GEL TOPICAL at 05:30

## 2021-08-31 RX ADMIN — Medication 650 MILLIGRAM(S): at 13:25

## 2021-08-31 RX ADMIN — LEVETIRACETAM 500 MILLIGRAM(S): 250 TABLET, FILM COATED ORAL at 05:30

## 2021-08-31 NOTE — PROGRESS NOTE ADULT - PROBLEM SELECTOR PLAN 5
Pt at baseline mental status, oriented to self  -C/w donepezil 10mg qd  -Per daughter, in the process of weaning off risperidone, currently on 0.5mg BID  -Fall precautions    PT eval - home pt  Disposition: Dc home today, pt has 24 hr care and family is highly involved.  DNR/DNI

## 2021-08-31 NOTE — DISCHARGE NOTE PROVIDER - NSDCHHPTASSISTHOME_GEN_ALL_CORE
Called patient to discuss pap smear results. LVM to return call.
Informed patient of abnormal pap smear result (ASC-US). Patient reports of having same issues on the past requiring colposcopies. She will call GYN at [facility] for further evaluation. Pap smear also showed candida vaginitis and fluconazole was sent to her pharmacy.
Patient Needs Assistance to Leave Residence...

## 2021-08-31 NOTE — DISCHARGE NOTE PROVIDER - HOSPITAL COURSE
88F PMH of Alzheimer's dementia, complete heart block s/p pacemaker 5/2021, HLD, HTN, arthritis transferred from [place] for subdural hematoma from a fall. No LOC, no change in baseline mental status, hematoma on CT     Subdural hematoma and SAH   ·  Plan: Repeat CTH unchanged, baseline mental status. Family and HCP does not want neurosurgical interventions.  -neurochecks q 4hours  -Not on AC and holding DVT prophylaxis, INR 1.04  -C/w Keppra 500mg BID for 7 days  -Appreciate neurosurg recs  -R shoulder pain after fall, no fractures on x ray but patient unable to lift arm. Consult Ortho re: brace.  -R elbow pain after fall, full ROM, no point tenderness, no ecchymosis.  xray negative for fracture.  -HA after fall resolved with tylenol, continue to monitor MSK pain and treat tylenol PRN.    Fall, r/o arrythmia  ·  Problem: History of complete heart block.   ·  Plan: Hx of complete heart block per daughter, s/p pacemaker placement 5/2021  -Obtain outside records from [facility]  -Possible syncope leading to fall? but no witnessed loss of consciousness per HHA  -Interrogate pacemaker      Arthritis.   ·  Plan: Most likely osteoarthritis  -Topical NSAIDS  -Cold compresses.    Hypertension.   ·  Plan: -C/w home lisinopril 10mg qd.     Alzheimer's dementia.   ·  Plan: Pt at baseline mental status, oriented to self  -C/w donepezil 10mg qd  -Per daughter, in the process of weaning off risperidone, currently on 0.5mg BID  -Fall precautions.

## 2021-08-31 NOTE — DISCHARGE NOTE PROVIDER - NSDCMRMEDTOKEN_GEN_ALL_CORE_FT
donepezil 10 mg oral tablet: 1 tab(s) orally once a day (at bedtime)  levocetirizine 5 mg oral tablet: 1 tab(s) orally once a day (in the evening)  lisinopril 10 mg oral tablet: 1 tab(s) orally once a day  RisperDAL 0.5 mg oral tablet: 1 tab(s) orally 2 times a day  Rolling Walker: DIMAS:99  Dx; S06.5X9A  traZODone 50 mg oral tablet: orally once a day (at bedtime)  Vitamin D2 1.25 mg (50,000 intl units) oral capsule: 1 cap(s) orally once a week   acetaminophen 325 mg oral tablet: 2 tab(s) orally every 6 hours, As needed, Mild Pain (1 - 3), Moderate Pain (4 - 6)  diclofenac 1% topical gel: Apply topically to affected area 3 times a day  donepezil 10 mg oral tablet: 1 tab(s) orally once a day (at bedtime)  levETIRAcetam 500 mg oral tablet: 1 tab(s) orally 2 times a day until 9/5  lisinopril 10 mg oral tablet: 1 tab(s) orally once a day  RisperDAL 0.5 mg oral tablet: 1 tab(s) orally 2 times a day  Rolling Walker: DIMAS:99  Dx; S06.5X9A  Vitamin D2 1.25 mg (50,000 intl units) oral capsule: 1 cap(s) orally once a week

## 2021-08-31 NOTE — PROGRESS NOTE ADULT - ASSESSMENT
88F PMH of Alzheimer's dementia, complete heart block s/p pacemaker 5/2021, HLD, HTN, arthritis transferred from [place] for subdural hematoma from a fall. No LOC, no change in baseline mental status, hematoma on repeat CTH unchanged.

## 2021-08-31 NOTE — DISCHARGE NOTE NURSING/CASE MANAGEMENT/SOCIAL WORK - PATIENT PORTAL LINK FT
You can access the FollowMyHealth Patient Portal offered by [facility] by registering at the following website: [email or web address]. By joining [facility]’s FollowMyHealth portal, you will also be able to view your health information using other applications (apps) compatible with our system.

## 2021-08-31 NOTE — DISCHARGE NOTE PROVIDER - NSDCCPCAREPLAN_GEN_ALL_CORE_FT
PRINCIPAL DISCHARGE DIAGNOSIS  Diagnosis: Fall  Assessment and Plan of Treatment: Fall precautions  Physical therapy      SECONDARY DISCHARGE DIAGNOSES  Diagnosis: Subdural hematoma  Assessment and Plan of Treatment: Subdural hematoma and SAH on CT  Repeat CTH unchanged, baseline mental status. Family and HCP does not want neurosurgical interventions.    Diagnosis: Shoulder pain, right  Assessment and Plan of Treatment: Xray's negative  CT scan demonstrating no acute bony disruption, likely subacromial bursitis. Patient s/p recent shoulder arthroscopy, unknown procedure performed at OSH. Advised outpatient follow-up with primary surgeon upon discharge. No further w/u required this admission. Can wear sling for comfort as needed, but would advise passive ROM exercises daily.       PRINCIPAL DISCHARGE DIAGNOSIS  Diagnosis: Fall  Assessment and Plan of Treatment: Fall precautions  Physical therapy      SECONDARY DISCHARGE DIAGNOSES  Diagnosis: Subdural hematoma  Assessment and Plan of Treatment: Subdural hematoma and SAH on CT  Repeat CTH unchanged, baseline mental status. Family and HCP does not want neurosurgical interventions.    Diagnosis: Shoulder pain, right  Assessment and Plan of Treatment: Xray's negative  CT scan demonstrating no acute bony disruption, likely subacromial bursitis. Patient s/p recent shoulder arthroscopy, unknown procedure performed at OSH. Advised outpatient follow-up with primary surgeon upon discharge. No further w/u required this admission. Can wear sling for comfort as needed, but would advise passive ROM exercises daily.      Diagnosis: Hypertension  Assessment and Plan of Treatment: Take medications for your blood pressure as recommended.  Eat a heart healthy diet that is low in saturated fats and salt, and includes whole grains, fruits, vegetables and lean protein   Exercise regularly (consult with your physician or cardiologist first); maintain a heart healthy weight.   If you smoke - quit (A resource to help you stop smoking is the [facility] – phone number [phone].). Continue to follow with your primary physician or cardiologist.   Seek medical help for dizziness, Lightheadedness, Blurry vision, Headache, Chest pain, Shortness of breath  Follow up with your medical doctor to establish long term blood pressure treatment goals.

## 2021-08-31 NOTE — DISCHARGE NOTE PROVIDER - DISCHARGE DATE
Age appropriate behavior 31-Aug-2021 Oriented - self; Oriented - place; Oriented - time/Age appropriate behavior

## 2021-08-31 NOTE — PROGRESS NOTE ADULT - SUBJECTIVE AND OBJECTIVE BOX
[facility] Division of Hospital Medicine  Leyla Up MD  Pager (M-F, 8A-5P): [phone]  Other Times:  [phone]      SUBJECTIVE / OVERNIGHT EVENTS: Pt seen and examined at bedside this morning. She appears well. NAD. Resting comfortably in her chair with niece at bedside.  ADDITIONAL REVIEW OF SYSTEMS: 11 pt ros neg    MEDICATIONS  (STANDING):  diclofenac sodium 1% Gel 2 Gram(s) Topical three times a day  donepezil 10 milliGRAM(s) Oral at bedtime  levETIRAcetam 500 milliGRAM(s) Oral two times a day  lisinopril 10 milliGRAM(s) Oral daily  risperiDONE   Tablet 0.5 milliGRAM(s) Oral two times a day    MEDICATIONS  (PRN):  acetaminophen   Tablet .. 650 milliGRAM(s) Oral every 6 hours PRN Mild Pain (1 - 3), Moderate Pain (4 - 6)      I&O's Summary    30 Aug 2021 07:01  -  31 Aug 2021 07:00  --------------------------------------------------------  IN: 250 mL / OUT: 0 mL / NET: 250 mL        PHYSICAL EXAM:  Vital Signs Last 24 Hrs  T(C): 36.8 (31 Aug 2021 05:30), Max: 36.8 (31 Aug 2021 00:16)  T(F): 98.3 (31 Aug 2021 05:30), Max: 98.3 (31 Aug 2021 05:30)  HR: 60 (31 Aug 2021 05:30) (60 - 85)  BP: 166/78 (31 Aug 2021 05:30) (136/85 - 170/90)  BP(mean): --  RR: 18 (31 Aug 2021 05:30) (18 - 18)  SpO2: 97% (31 Aug 2021 05:30) (94% - 97%)  CONSTITUTIONAL: NAD, well-developed, well-groomed  EYES: PERRLA; conjunctiva and sclera clear  ENMT: Moist oral mucosa, no pharyngeal injection or exudates; normal dentition  NECK: Supple, no palpable masses; no thyromegaly  RESPIRATORY: Normal respiratory effort; lungs are clear to auscultation bilaterally  CARDIOVASCULAR: Regular rate and rhythm, normal S1 and S2, no murmur/rub/gallop; No lower extremity edema; Peripheral pulses are 2+ bilaterally  ABDOMEN: Nontender to palpation, normoactive bowel sounds, no rebound/guarding; No hepatosplenomegaly  MUSCULOSKELETAL:  Normal gait; no clubbing or cyanosis of digits; no joint swelling or tenderness to palpation  PSYCH: A+O to person, place, and time; affect appropriate  NEUROLOGY: CN 2-12 are intact and symmetric; no gross sensory deficits   SKIN: No rashes; no palpable lesions    LABS:                        13.6   7.66  )-----------( 245      ( 31 Aug 2021 06:57 )             41.8     08-31    139  |  101  |  9   ----------------------------<  83  3.9   |  26  |  0.60    Ca    9.5      31 Aug 2021 06:59    TPro  6.4  /  Alb  3.7  /  TBili  0.6  /  DBili  x   /  AST  12  /  ALT  7<L>  /  AlkPhos  54  08-30    PT/INR - ( 30 Aug 2021 06:47 )   PT: 12.0 sec;   INR: 1.00 ratio         PTT - ( 30 Aug 2021 06:47 )  PTT:32.1 sec        Imaging personally reviewed: CT right shoulder does not demonstrate any acute fracture.    COORDINATION OF CARE:  Care Discussed with Consultants/Other Providers [Y/N]: NSG and Palliative reccs noted.

## 2021-08-31 NOTE — CHART NOTE - NSCHARTNOTEFT_GEN_A_CORE
Orthopedics    Called by medical team regarding immobilization mgmt for right shoulder pain s/p MF several days. Xray's negative, advised CT scan if persistent concern for acute bony pathology existed. CT scan performed today demonstrating no acute bony disruption, likely subacromial bursitis. Patient s/p recent shoulder arthroscopy, unknown procedure performed at OSH. Advised outpatient follow-up with primary surgeon upon discharge. No further w/u required this admission. Can wear sling for comfort as needed, but would advise passive ROM exercises daily.

## 2021-08-31 NOTE — PROGRESS NOTE ADULT - PROBLEM SELECTOR PLAN 2
Hx of complete heart block per daughter, s/p pacemaker placement 5/2021  -pacemaker checked and no acute events, 100% paced

## 2021-08-31 NOTE — DISCHARGE NOTE PROVIDER - CARE PROVIDER_API CALL
Jasmin Krueger)  Geriatric Medicine; Internal Medicine  [address], [address]  [place] [address]  Phone: [phone]  Fax: [phone]  Follow Up Time:

## 2021-08-31 NOTE — PROGRESS NOTE ADULT - PROBLEM SELECTOR PLAN 1
Repeat CTH unchanged, baseline mental status. Family and HCP does not want neurosurgical interventions.  -neurochecks q 4hours  -Not on AC and holding DVT prophylaxis, INR 1.04  -C/w Keppra 500mg BID for 7 days  -Appreciate neurosurg recs  -R shoulder pain after fall, no fractures on x ray but patient unable to lift arm. Consult Ortho. CT appreciated, no fracture. Brace for outpatient.  -R elbow pain after fall, full ROM, no point tenderness, no ecchymosis.  xray negative for fracture.  -HA after fall resolved with tylenol, continue to monitor MSK pain and treat tylenol PRN.

## 2021-08-31 NOTE — GOALS OF CARE CONVERSATION - ADVANCED CARE PLANNING - CONVERSATION DETAILS
I d/w the patient's  HCP about the patient being eligible for home hospice 2/2 to advanced dementia, FAST 7c. The HCP was initially concerned about hospice since she associated that with having to place her mom in a "home" or with her mom dying soon. I indicated that hospice provides supportive care for patients with advanced and incurable illness. I stated that this insurance focuses on maximizing comfort and trying to keep the patient at home, hence, avoiding visits to the hospital. I also indicate the patient's primary doctor will be able to continue to direct the patient's care while being under hospice care. Her daughter agree with a hospice referral. [facility] and they will reach out to the patient's HCP. I will also inform the patient's PMD about my discussion with the patient's daughter.     I spent a total time of 16  mins with the patient at bedside of which more than 50% of time was spent on counseling/coordination of care.

## 2021-09-01 ENCOUNTER — NON-APPOINTMENT (OUTPATIENT)
Age: 86
End: 2021-09-01

## 2021-09-01 PROBLEM — I10 ESSENTIAL (PRIMARY) HYPERTENSION: Chronic | Status: ACTIVE | Noted: 2021-08-30

## 2021-09-01 PROBLEM — M19.90 UNSPECIFIED OSTEOARTHRITIS, UNSPECIFIED SITE: Chronic | Status: ACTIVE | Noted: 2021-08-30

## 2021-09-01 PROBLEM — Z86.79 PERSONAL HISTORY OF OTHER DISEASES OF THE CIRCULATORY SYSTEM: Chronic | Status: ACTIVE | Noted: 2021-08-30

## 2021-09-01 PROBLEM — G30.9 ALZHEIMER'S DISEASE, UNSPECIFIED: Chronic | Status: ACTIVE | Noted: 2021-08-30

## 2021-09-01 PROBLEM — E78.5 HYPERLIPIDEMIA, UNSPECIFIED: Chronic | Status: ACTIVE | Noted: 2021-08-30

## 2021-09-13 ENCOUNTER — APPOINTMENT (OUTPATIENT)
Dept: GERIATRICS | Facility: CLINIC | Age: 86
End: 2021-09-13
Payer: MEDICARE

## 2021-09-13 VITALS
RESPIRATION RATE: 14 BRPM | HEART RATE: 63 BPM | BODY MASS INDEX: 17.71 KG/M2 | DIASTOLIC BLOOD PRESSURE: 70 MMHG | OXYGEN SATURATION: 96 % | SYSTOLIC BLOOD PRESSURE: 110 MMHG | HEIGHT: 62 IN | WEIGHT: 96.25 LBS

## 2021-09-13 DIAGNOSIS — G31.84 MILD COGNITIVE IMPAIRMENT, SO STATED: ICD-10-CM

## 2021-09-13 DIAGNOSIS — Z13.31 ENCOUNTER FOR SCREENING FOR DEPRESSION: ICD-10-CM

## 2021-09-13 PROCEDURE — 99495 TRANSJ CARE MGMT MOD F2F 14D: CPT

## 2021-09-14 RX ORDER — TIMOLOL MALEATE 5 MG/ML
0.5 SOLUTION OPHTHALMIC
Qty: 5 | Refills: 0 | Status: DISCONTINUED | COMMUNITY
Start: 2017-01-11 | End: 2021-09-14

## 2021-09-14 RX ORDER — LATANOPROST/PF 0.005 %
0.01 DROPS OPHTHALMIC (EYE)
Qty: 8 | Refills: 0 | Status: DISCONTINUED | COMMUNITY
Start: 2017-01-11 | End: 2021-09-14

## 2021-09-14 RX ORDER — RISPERIDONE 0.5 MG/1
0.5 TABLET, FILM COATED ORAL TWICE DAILY
Qty: 60 | Refills: 3 | Status: DISCONTINUED | COMMUNITY
Start: 2021-06-30 | End: 2021-09-14

## 2021-10-18 ENCOUNTER — APPOINTMENT (OUTPATIENT)
Dept: GERIATRICS | Facility: CLINIC | Age: 86
End: 2021-10-18

## 2021-10-25 ENCOUNTER — APPOINTMENT (OUTPATIENT)
Dept: GERIATRICS | Facility: CLINIC | Age: 86
End: 2021-10-25

## 2021-11-04 NOTE — ED PROVIDER NOTE - COVID-19 ORDERING FACILITY
GI Discharge Instructions      11/4/2021    During your exam, the physician:    Removed 2 polyps and Results will be called or mailed to you in 7-10 business days    DIET INSTRUCTIONS:  Resume your regular diet    PRESCRIPTIONS/MEDICATIONS  No new prescriptions given today    A RESPONSIBLE ADULT MUST ACCOMPANY YOU AND DRIVE YOU HOME    You had the following procedure(s) today:   Colonoscopy    1. If a biopsy and/or a polyp removal was performed today. There is no need to hold any aspirin or anti-inflammatory medications.   2. Following sedation, your judgement, perception and coordination are impaired.      Therefore, FOR 24 HOURS:  · Don't drive or use heavy equipment.  · Don't make important decisions or sign documents.  · Don't drink alcohol.  · Have someone stay with you, if possible. They can watch for problems and help keep you safe    Please call your physician in the event that you experience any of the following or proceed to the nearest hospital in the event of an emergency:     COLONOSCOPY  Fever  Severe abdominal distention or pain. Some mild distention and/or cramping are normal after these procedures but should pass within an hour or two with the passage of air.  Rectal bleeding more than blood streaking on the toilet  tissue  Nausea or Vomiting    If you have any questions or concerns, contact Dr. Eddie Bae.    ADDITIONAL INSTRUCTIONS: none                   [facility]

## 2021-12-07 ENCOUNTER — APPOINTMENT (OUTPATIENT)
Dept: NEUROLOGY | Facility: CLINIC | Age: 86
End: 2021-12-07

## 2021-12-09 NOTE — H&P ADULT - PROBLEM SELECTOR PLAN 5
r/o dysphagia Pt at baseline mental status, oriented to self  -C/w donepezil 10mg qd  -Per daughter, in the process of weaning off risperidone, currently on 0.5mg BID  -1:1 for fall risk Pt at baseline mental status, oriented to self  -C/w donepezil 10mg qd  -Per daughter, in the process of weaning off risperidone, currently on 0.5mg BID  -Fall precautions

## 2022-01-10 ENCOUNTER — APPOINTMENT (OUTPATIENT)
Dept: GERIATRICS | Facility: CLINIC | Age: 87
End: 2022-01-10
Payer: MEDICARE

## 2022-01-10 VITALS
RESPIRATION RATE: 16 BRPM | HEART RATE: 65 BPM | OXYGEN SATURATION: 96 % | WEIGHT: 93 LBS | SYSTOLIC BLOOD PRESSURE: 120 MMHG | BODY MASS INDEX: 16.9 KG/M2 | HEIGHT: 62.4 IN | DIASTOLIC BLOOD PRESSURE: 70 MMHG

## 2022-01-10 DIAGNOSIS — G30.1 ALZHEIMER'S DISEASE WITH LATE ONSET: ICD-10-CM

## 2022-01-10 DIAGNOSIS — F02.80 ALZHEIMER'S DISEASE WITH LATE ONSET: ICD-10-CM

## 2022-01-10 PROCEDURE — 90662 IIV NO PRSV INCREASED AG IM: CPT

## 2022-01-10 PROCEDURE — G0008: CPT

## 2022-01-10 PROCEDURE — 99215 OFFICE O/P EST HI 40 MIN: CPT | Mod: 25

## 2022-01-10 RX ORDER — DONEPEZIL HYDROCHLORIDE 5 MG/1
5 TABLET ORAL
Qty: 30 | Refills: 3 | Status: DISCONTINUED | COMMUNITY
Start: 2020-09-21 | End: 2022-01-10

## 2022-01-10 NOTE — REASON FOR VISIT
[Follow-Up] : a follow-up visit [Formal Caregiver] : formal caregiver [Family Member] : family member [FreeTextEntry1] : dementia, restlessness, agitation [FreeTextEntry3] : Vasu Ruiz  [FreeTextEntry2] : who assists with history d/t pt confused at baseline

## 2022-01-10 NOTE — SOCIAL HISTORY
[Alone] : lives alone [Apartment] : in an apartment [Female] : Female [FreeTextEntry1] : Eugenia [FreeTextEntry4] : 2x12h HHA via Medicaid

## 2022-01-10 NOTE — REVIEW OF SYSTEMS
[Loss Of Hearing] : hearing loss [Incontinence] : incontinence [As Noted in HPI] : as noted in HPI [Negative] : Heme/Lymph [FreeTextEntry3] : wears glasses [FreeTextEntry7] : FI  [FreeTextEntry9] : worsened mobility since recent hospitalization

## 2022-01-10 NOTE — PHYSICAL EXAM
[EOMI] : extraocular movements were intact [Heart Rate And Rhythm] : heart rate was normal and rhythm regular [Pedal Pulses Normal] : the pedal pulses are present [Normal] : normal skin color and pigmentation [Normal Hearing] : hearing was not normal [Normal Gait] : abnormal gait [Normal Insight/Judgment] : insight and judgment were not intact [de-identified] : restless [de-identified] : L chest PPM [de-identified] : b/l LE swelling [de-identified] : b/l puffy hands, b/l LE edema, slow unsteady gait  [de-identified] : alert, unsteady gait, minimally verbal  [de-identified] : restless, cooperative

## 2022-01-10 NOTE — ASSESSMENT
[FreeTextEntry1] : \par Insomnia improved, now "sleeping too much" per dtr\par \par Continues with poor appetite\par - encourage/assist PO intake as tolerated\par - c/w comfort feeds when pt cooperative, adv not to force feed\par Aspiration precautions discussed\par Adv to STOP Aricept\par \par Behavioral interventions discussed\par Fall precuations\par \par Change Trazodone to 25mg BID for now\par Will consider trial of Remeron QHS while tapering off Trazodone at next visit\par \par c/w hospice \par - Education, counseling, support provided today\par -Referred to  for additional counseling, education, support, resources\par - c/w 24/7 supervision for safety and assistance w/ ADLs\par \par - discussed r/b/a of influenza vaccine and wishes to get today.\par \par Repeat labs ordered - f/u \par \par f/u in 1 mo, unless earlier PRN \par

## 2022-01-10 NOTE — HISTORY OF PRESENT ILLNESS
[Patient reported vision is normal] : Patient reported vision is normal [Patient declined colon rectal/cancer screening] : Patient declined colon/rectal cancer screening [Patient declined skin cancer screening] : Patient declined skin cancer screening [Patient declined breast sonogram] : Patient declined breast sonogram [Patient declined cervical cancer screening] : Patient declined cervical cancer screening [Any fall with injury in past year] : Patient reported fall with injury in the past year [Independent] : transferring/mobility [Completely Dependent] : Completely dependent. [Full assistance needed] : Assistance needed managing medications [FAST Score: ____] : Functional Assessment Scale (FAST) Score: [unfilled] [Smoke Detector] : smoke detector [Carbon Monoxide Detector] : carbon monoxide detector [Other reason not done] : Other reason not done [Severe] : Stage: Severe [Worse] : Status: Worse [Memory Lapses Or Loss] : worsened memory impairment [Patient Observed To Be Agitated] : improved agitation [Hostility Toward Caregivers] : improved aggression [Sleep Disturbances] : improved sleep disturbances [] : improved wandering [Fixed Beliefs Contradicted By Reality (Delusions)] : denies delusions [Difficulty Finding Desired Words] : stable difficulty finding desired words [With Patient/Caregiver] : , with patient/caregiver [Designated Healthcare Proxy] : Designated healthcare proxy [FreeTextEntry1] : \par TODAY pt denies complaints however limited historian d/t baseline confusion/memory loss. \par \par Dtr states pt sleeping too much - more since recent hospitalization. \par \par Very restless - doesn't sit down. \par \par Fights/scratches when HHA/family try to feed.  Does like ferina. Takes 1-2 ensure daily. \par POOR APPETITE since hospitalization in April '21. \par No coughing when eating or drinking. \par \par Shoulder pain better since last visit - moving it around more, no s/s of pain per daughter. \par \par On hospice. \par Has 24/7 HHA help. \par \par - 9/21: Hospitalized in 8/21for SDH s/p fall. Repeat CTH unchanged, baseline mental status. Family and HCP does not want neurosurgical interventions. R shoulder pain after fall, no fractures on x ray but patient unable to lift arm. Xray's negative. CT scan demonstrating no acute bony disruption, likely subacromial bursitis. Patient s/p recent shoulder arthroscopy, unknown procedure performed at OSH.\par Advised outpatient follow-up with primary surgeon upon discharge. No further w/u required this admission. Can wear sling for comfort as needed, but would advise passive ROM exercises daily. R elbow pain after fall, full ROM, no point tenderness, no ecchymosis. xray negative for fracture.\par \par New UI since hospitalization in April '21. \par \par - Problems with thinking or memory\par [x ]  Memory loss, progressively worse, needs help with ADLs \par - First symptoms noted: approx 2016 \par \par - Appetite: - Taste has changed - piece of coconut in the food bothers her and refuses to eat it.  Tolerates only smooth foods without texture. \par - HHA present states patient would rather drink ensure than solid foods. Wt fluctuates between 96-106lbs.\par [ ] stable  [x ] decreased [ ] increased [ ] chronically poor\par \par - Motor Syx: restless, always moving around\par \par CHRONIC GAIT INSTABILITY\par - Walks with assistance \par - was given script for rollator\par \par - BPSD: \par    - BEHAVIOR: \par       [x] Disrupted sleep: now sleeping too much per dtr\par                                      [] insomnia  [x ] hypersomnia  [ ] sleep-wake cycle reversal  [ ] fragmented sleep\par       [x ] Wandering \par       [x] Poor judgment\par       [ ] Repetitive behavior / thoughts / speech\par       [x ] Difficulty learning new things\par       [x ] Self-neglect \par       [ ] Delusions\par       [ ] Hallucinations - resolved, no recent \par       [ ] Verbal Aggression  \par       [ ] Physical Aggression \par       [ ] Substance Abuse \par \par    - MOOD: cries sometimes when confused and doesn't recognize where she is \par       [x ] Apathy/indifference  [ ] Disinhibition  \par       [ ] Easily irritable  \par       [x ] Depressed\par       [x ] Anxious \par \par - MOCA 0/30 on 8/23/21 and MMSE 5/30 in Aug '21 with   \par - MRI brain 2017: not impressive \par \par [x] Neuro Dr. Barry - imp: LOAD, deconditioning. \par [ ] Psych\par \par - Previously on exelon - stopped d/t rash \par - Previously on Risperidone - tapered off d/t more sleepy/calm after hospitalization in 8/21\par - Takes Aricept - dtr thought it was stopped but still in her meds - reviewed today \par - Takes  Trazodone QHS\par \par HTN / CHB s/p PPM in May '21\par - TTE may '21: LVEF 45%\par - follows w/ cards Dr. Monte, and Dr. Connor Royal for EP/PPM checks \par \par HCM\par - PMD: Dr. Kathie Mane  [TextBox_31] : was rec hearing aids but pending f/u with audiologist  [TextBox_37] : Dr. Mary Adrian last seen in 2020, due for f/u  [FreeTextEntry7] : - h/o PNA vaccine prior to 2019  - doesn't remember which ; follows w/ podiatrist for onychomycosis  [Driving Concerns] : not driving or driving without noted concerns [[facility]] : >12 sec  [de-identified] : dtr manages  [de-identified] : 5 on 8/6/21  [AdvancecareDate] : 9/13/21  [FreeTextEntry4] : HCP form on file: dtr Sara is primary HCP, alternate is Sylvain\par MOLST on file: DNR, DNI, limited med interventions, NFT, send to hospital, limitation of abx \par Dtr states pt would not want aggressive interventions at EOL.  Hospice at home discussed - dtr wishes for eval

## 2022-01-13 LAB
25(OH)D3 SERPL-MCNC: 59.6 NG/ML
ANION GAP SERPL CALC-SCNC: 12 MMOL/L
BASOPHILS # BLD AUTO: 0.04 K/UL
BASOPHILS NFR BLD AUTO: 0.5 %
BUN SERPL-MCNC: 13 MG/DL
CALCIUM SERPL-MCNC: 9.7 MG/DL
CHLORIDE SERPL-SCNC: 104 MMOL/L
CO2 SERPL-SCNC: 27 MMOL/L
CREAT SERPL-MCNC: 0.83 MG/DL
EOSINOPHIL # BLD AUTO: 0.16 K/UL
EOSINOPHIL NFR BLD AUTO: 2.1 %
GLUCOSE SERPL-MCNC: 105 MG/DL
HCT VFR BLD CALC: 45 %
HGB BLD-MCNC: 14 G/DL
IMM GRANULOCYTES NFR BLD AUTO: 0.3 %
LYMPHOCYTES # BLD AUTO: 1.23 K/UL
LYMPHOCYTES NFR BLD AUTO: 16.3 %
MAN DIFF?: NORMAL
MCHC RBC-ENTMCNC: 28.6 PG
MCHC RBC-ENTMCNC: 31.1 GM/DL
MCV RBC AUTO: 92 FL
MONOCYTES # BLD AUTO: 0.75 K/UL
MONOCYTES NFR BLD AUTO: 9.9 %
NEUTROPHILS # BLD AUTO: 5.35 K/UL
NEUTROPHILS NFR BLD AUTO: 70.9 %
PLATELET # BLD AUTO: 268 K/UL
POTASSIUM SERPL-SCNC: 4.2 MMOL/L
RBC # BLD: 4.89 M/UL
RBC # FLD: 14.3 %
SODIUM SERPL-SCNC: 143 MMOL/L
WBC # FLD AUTO: 7.55 K/UL

## 2022-01-13 RX ORDER — CHOLECALCIFEROL (VITAMIN D3) 1250 MCG
1.25 MG CAPSULE ORAL
Qty: 8 | Refills: 2 | Status: DISCONTINUED | COMMUNITY
Start: 2021-09-13 | End: 2022-01-13

## 2022-04-11 PROBLEM — Z11.59 SCREENING FOR VIRAL DISEASE: Status: ACTIVE | Noted: 2021-08-23

## 2022-05-20 NOTE — ED ADULT NURSE NOTE - CAS TRG GEN SKIN COLOR
Subjective:       Patient ID:  Efra Corey is a 61 y.o. male who presents for   Chief Complaint   Patient presents with    Skin Check     face     Patient present for face check. LOV 4/3/18 by Yocasta Nava MD.    C/o lesions to face x years. The patient denies any change, including change in color, increase in size, or spontaneous bleeding, associated with these lesions. Not treating.    yes Phx of NMSC.  + BCC- R neck MOHS 2018  + Bcc- R nasal dorsum MOHS 2018  + BCC L ala MOHS 2017    no Fhx of melanoma.    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    Skin: Positive for activity-related sunscreen use. Negative for dry skin, daily sunscreen use, sensitivity to antibiotic ointment and sensitivity to bandage adhesive.   Hematologic/Lymphatic: Bruises/bleeds easily.        Objective:    Physical Exam   Constitutional: He appears well-developed and well-nourished. No distress.   Neurological: He is alert and oriented to person, place, and time. He is not disoriented.   Psychiatric: He has a normal mood and affect.   Skin:   Areas Examined (abnormalities noted in diagram):   Scalp / Hair Palpated and Inspected  Head / Face Inspection Performed  Neck Inspection Performed              Diagram Legend     Erythematous scaling macule/papule c/w actinic keratosis       Vascular papule c/w angioma      Pigmented verrucoid papule/plaque c/w seborrheic keratosis      Yellow umbilicated papule c/w sebaceous hyperplasia      Irregularly shaped tan macule c/w lentigo     1-2 mm smooth white papules consistent with Milia      Movable subcutaneous cyst with punctum c/w epidermal inclusion cyst      Subcutaneous movable cyst c/w pilar cyst      Firm pink to brown papule c/w dermatofibroma      Pedunculated fleshy papule(s)  c/w skin tag(s)      Evenly pigmented macule c/w junctional nevus     Mildly variegated pigmented, slightly irregular-bordered macule c/w mildly atypical nevus      Flesh colored to evenly pigmented papule c/w intradermal nevus       Pink pearly papule/plaque c/w basal cell carcinoma      Erythematous hyperkeratotic cursted plaque c/w SCC      Surgical scar with no sign of skin cancer recurrence      Open and closed comedones      Inflammatory papules and pustules      Verrucoid papule consistent consistent with wart     Erythematous eczematous patches and plaques     Dystrophic onycholytic nail with subungual debris c/w onychomycosis     Umbilicated papule    Erythematous-base heme-crusted tan verrucoid plaque consistent with inflamed seborrheic keratosis     Erythematous Silvery Scaling Plaque c/w Psoriasis     See annotation      Assessment / Plan:        Hx of nonmelanoma skin cancer/Encounter for screening for malignant neoplasm of skin  Area(s) of previous NMSC evaluated with no signs of recurrence.    Face exam performed today. No lesions suspicious for malignancy noted.    Recommend daily sun protection/avoidance and use of at least SPF 30, broad spectrum sunscreen (OTC drug).     Actinic keratosis  Cryosurgery Procedure Note    Verbal consent from the patient is obtained including, but not limited to, risk of hypopigmentation/hyperpigmentation, scar, recurrence of lesion. The patient is aware of the precancerous quality and need for treatment of these lesions. Liquid nitrogen cryosurgery is applied to the 6 actinic keratoses, as detailed in the physical exam, to produce a freeze injury. The patient is aware that blisters may form and is instructed on wound care with gentle cleansing and use of vaseline ointment to keep moist until healed. The patient is supplied a handout on cryosurgery and is instructed to call if lesions do not completely resolve.    - After lesions heal, start on scalp: fluorouraciL  (EFUDEX) 5 % cream; AAA bid x 2 weeks  Dispense: 40 g; Refill: 1    Seborrheic keratoses  These are benign inherited growths without a malignant potential. Reassurance given to patient. No treatment is necessary.              No follow-ups on file.   Normal for race

## 2022-06-06 ENCOUNTER — APPOINTMENT (OUTPATIENT)
Dept: GERIATRICS | Facility: CLINIC | Age: 87
End: 2022-06-06
Payer: MEDICARE

## 2022-06-06 VITALS
TEMPERATURE: 97.8 F | WEIGHT: 103 LBS | DIASTOLIC BLOOD PRESSURE: 80 MMHG | HEIGHT: 62 IN | OXYGEN SATURATION: 95 % | BODY MASS INDEX: 18.95 KG/M2 | RESPIRATION RATE: 16 BRPM | SYSTOLIC BLOOD PRESSURE: 160 MMHG | HEART RATE: 74 BPM

## 2022-06-06 DIAGNOSIS — Z86.79 PERSONAL HISTORY OF OTHER DISEASES OF THE CIRCULATORY SYSTEM: ICD-10-CM

## 2022-06-06 DIAGNOSIS — R45.1 RESTLESSNESS AND AGITATION: ICD-10-CM

## 2022-06-06 DIAGNOSIS — M79.89 OTHER SPECIFIED SOFT TISSUE DISORDERS: ICD-10-CM

## 2022-06-06 PROCEDURE — 99215 OFFICE O/P EST HI 40 MIN: CPT

## 2022-06-06 NOTE — REVIEW OF SYSTEMS
[Loss Of Hearing] : hearing loss [Incontinence] : incontinence [As Noted in HPI] : as noted in HPI [Negative] : Heme/Lymph [FreeTextEntry3] : wears glasses [FreeTextEntry7] : FI +

## 2022-06-06 NOTE — PHYSICAL EXAM
[EOMI] : extraocular movements were intact [Heart Rate And Rhythm] : heart rate was normal and rhythm regular [Pedal Pulses Normal] : the pedal pulses are present [Normal] : alert, in no acute distress [Normal Hearing] : hearing was not normal [Edema] : edema was not present [Normal Gait] : abnormal gait [Normal Insight/Judgment] : insight and judgment were not intact [de-identified] : L chest PPM [de-identified] : b/l LE swelling resolved since last visit  [de-identified] : slow unsteady gait , generalized weakness  [de-identified] : alert, unsteady gait, minimally verbal  [de-identified] : calm, cooperative , confused

## 2022-06-06 NOTE — HISTORY OF PRESENT ILLNESS
[Patient reported vision is normal] : Patient reported vision is normal [Patient declined colon rectal/cancer screening] : Patient declined colon/rectal cancer screening [Patient declined skin cancer screening] : Patient declined skin cancer screening [Patient declined breast sonogram] : Patient declined breast sonogram [Patient declined cervical cancer screening] : Patient declined cervical cancer screening [Any fall with injury in past year] : Patient reported fall with injury in the past year [Full assistance needed] : Assistance needed managing medications [FAST Score: ____] : Functional Assessment Scale (FAST) Score: [unfilled] [Smoke Detector] : smoke detector [Carbon Monoxide Detector] : carbon monoxide detector [Other reason not done] : Other reason not done [Severe] : Stage: Severe [Worse] : Status: Worse [Memory Lapses Or Loss] : worsened memory impairment [Patient Observed To Be Agitated] : improved agitation [Hostility Toward Caregivers] : improved aggression [Sleep Disturbances] : improved sleep disturbances [Fixed Beliefs Contradicted By Reality (Delusions)] : denies delusions [Difficulty Finding Desired Words] : stable difficulty finding desired words [With Patient/Caregiver] : , with patient/caregiver [Designated Healthcare Proxy] : Designated healthcare proxy [Completely Dependent] : Completely dependent. [] : Assistance needed with transferring/mobility. [FreeTextEntry1] : No acute events since last visit including falls, hospitalizations, ED visits, urgent care visits.\par \par TODAY pt denies complaints however limited historian d/t baseline confusion/memory loss. \par \par Pt doing better overall. Off hospice now. Eating better and gained 10 lbs since last visit. Eats pureed diet. \par \par Walking with assistance.\par \par Sleepign well. \par No urinary or bowel habit complaints. \par No c/o pain. \par \par Has 24/7 HHA help. \par \par Dtr requests pull ups in medium size. \par \par \par DEMENTIA / h/o SDH\par - 9/21: Hospitalized in 8/21for SDH s/p fall. Repeat CTH unchanged, baseline mental status. Family and HCP does not want neurosurgical interventions. R shoulder pain after fall, no fractures on x ray but patient unable to lift arm. Xray's negative. CT scan demonstrating no acute bony disruption, likely subacromial bursitis. Patient s/p recent shoulder arthroscopy, unknown procedure performed at OSH.\par Advised outpatient follow-up with primary surgeon upon discharge. No further w/u required this admission. Can wear sling for comfort as needed, but would advise passive ROM exercises daily. R elbow pain after fall, full ROM, no point tenderness, no ecchymosis. xray negative for fracture.\par \par - MOCA 0/30 on 8/23/21 and MMSE 5/30 in Aug '21 with   \par - MRI brain 2017: not impressive \par \par [x] Neuro Dr. Barry - imp: LOAD, deconditioning. \par [ ] Psych\par \par - Previously on exelon - stopped d/t rash \par - Previously on Risperidone - tapered off d/t more sleepy/calm after hospitalization in 8/21\par - Previously on Aricept - stopped when enrolled into hospice \par - Takes Trazodone and tolerating well \par \par HTN / CHB s/p PPM in May '21\par - TTE may '21: LVEF 45%\par - follows w/ cards Dr. Monte, and Dr. Connor Royal for EP/PPM checks \par \par HCM\par - PMD: Dr. Kathie Mane  [TextBox_31] : was rec hearing aids but pending f/u with audiologist  [TextBox_37] : Dr. Mary Adrian last seen in 2020, due for f/u  [FreeTextEntry7] : - h/o PNA vaccine prior to 2019  - doesn't remember which ; follows w/ podiatrist for onychomycosis  [Driving Concerns] : not driving or driving without noted concerns [[facility]] : >12 sec  [FreeTextEntry8] : UI since hospitalization in April '21 [de-identified] : dtr manages  [de-identified] : 5 on 8/6/21  [AdvancecareDate] : 9/13/21  [FreeTextEntry4] : HCP form on file: dtr Sara is primary HCP, alternate is Sylvain\par MOLST on file: DNR, DNI, limited med interventions, NFT, send to hospital, limitation of abx \par Dtr states pt would not want aggressive interventions at EOL.  Hospice at home discussed - dtr wishes for eval

## 2022-06-06 NOTE — ASSESSMENT
[FreeTextEntry1] : Off hospice now\par Improved BPSD - manageable\par \par - Discussed with dtr Sara via telephone\par \par - Education, counseling, support provided today\par \par Repeat BW ordered - f/u \par \par - c/w 24/7 supervision for safety and assistance w/ ADLs\par Aspiration precautions\par Fall precautions\par \par Dtr will call me to review meds and which refills are needed\par \par - encourage/assist PO intake as tolerated\par - c/w comfort feeds when pt cooperative, adv not to force feed\par \par c/w Trazodone to 25mg BID for now\par \par f/u in 3 mo, unless earlier PRN \par

## 2022-06-06 NOTE — REASON FOR VISIT
[Follow-Up] : a follow-up visit [Formal Caregiver] : formal caregiver [Family Member] : family member [FreeTextEntry1] : dementia, restlessness, agitation [FreeTextEntry3] : JOSE Guerrero, and veronica Ruiz via telephone   [FreeTextEntry2] : who assist with history d/t pt confused at baseline

## 2022-06-07 LAB
25(OH)D3 SERPL-MCNC: 50.2 NG/ML
ALBUMIN SERPL ELPH-MCNC: 4.2 G/DL
ALP BLD-CCNC: 82 U/L
ALT SERPL-CCNC: 12 U/L
ANION GAP SERPL CALC-SCNC: 11 MMOL/L
AST SERPL-CCNC: 14 U/L
BASOPHILS # BLD AUTO: 0.05 K/UL
BASOPHILS NFR BLD AUTO: 0.8 %
BILIRUB SERPL-MCNC: 0.3 MG/DL
BUN SERPL-MCNC: 15 MG/DL
CALCIUM SERPL-MCNC: 9.3 MG/DL
CHLORIDE SERPL-SCNC: 103 MMOL/L
CO2 SERPL-SCNC: 27 MMOL/L
CREAT SERPL-MCNC: 0.76 MG/DL
EGFR: 75 ML/MIN/1.73M2
EOSINOPHIL # BLD AUTO: 0.12 K/UL
EOSINOPHIL NFR BLD AUTO: 1.9 %
GLUCOSE SERPL-MCNC: 118 MG/DL
HCT VFR BLD CALC: 42.8 %
HGB BLD-MCNC: 13.7 G/DL
IMM GRANULOCYTES NFR BLD AUTO: 0.3 %
LYMPHOCYTES # BLD AUTO: 1.46 K/UL
LYMPHOCYTES NFR BLD AUTO: 23.1 %
MAN DIFF?: NORMAL
MCHC RBC-ENTMCNC: 30.3 PG
MCHC RBC-ENTMCNC: 32 GM/DL
MCV RBC AUTO: 94.7 FL
MONOCYTES # BLD AUTO: 0.59 K/UL
MONOCYTES NFR BLD AUTO: 9.3 %
NEUTROPHILS # BLD AUTO: 4.09 K/UL
NEUTROPHILS NFR BLD AUTO: 64.6 %
PLATELET # BLD AUTO: 266 K/UL
POTASSIUM SERPL-SCNC: 4.1 MMOL/L
PROT SERPL-MCNC: 6.7 G/DL
RBC # BLD: 4.52 M/UL
RBC # FLD: 13.1 %
SODIUM SERPL-SCNC: 141 MMOL/L
WBC # FLD AUTO: 6.33 K/UL

## 2022-06-10 DIAGNOSIS — J30.9 ALLERGIC RHINITIS, UNSPECIFIED: ICD-10-CM

## 2022-06-13 ENCOUNTER — APPOINTMENT (OUTPATIENT)
Dept: NEUROLOGY | Facility: CLINIC | Age: 87
End: 2022-06-13
Payer: MEDICARE

## 2022-06-13 VITALS
WEIGHT: 102 LBS | RESPIRATION RATE: 16 BRPM | HEIGHT: 62 IN | BODY MASS INDEX: 18.77 KG/M2 | SYSTOLIC BLOOD PRESSURE: 175 MMHG | DIASTOLIC BLOOD PRESSURE: 85 MMHG | HEART RATE: 81 BPM

## 2022-06-13 PROCEDURE — 99214 OFFICE O/P EST MOD 30 MIN: CPT

## 2022-06-13 RX ORDER — ACETAMINOPHEN 325 MG/1
325 TABLET ORAL EVERY 6 HOURS
Refills: 0 | Status: DISCONTINUED | COMMUNITY
Start: 2021-09-13 | End: 2022-06-13

## 2022-06-13 NOTE — PHYSICAL EXAM
[General Appearance - Alert] : alert [General Appearance - In No Acute Distress] : in no acute distress [Oriented To Time, Place, And Person] : oriented to person, place, and time [Impaired Insight] : insight and judgment were intact [Affect] : the affect was normal [Person] : oriented to person [Concentration Intact] : normal concentrating ability [Visual Intact] : visual attention was ~T not ~L decreased [Naming Objects] : no difficulty naming common objects [Fluency] : fluency intact [Comprehension] : comprehension intact [Past History] : adequate knowledge of personal past history [Vocabulary] : adequate range of vocabulary [Total Score ___ / 30] : the patient achieved a score of [unfilled] /30 [Date / Time ___ / 5] : date / time [unfilled] / 5 [Place ___ / 5] : place [unfilled] / 5 [Registration ___ / 3] : registration [unfilled] / 3 [Serial Sevens ___/5] : serial sevens [unfilled] / 5 [Naming 2 Objects ___ / 2] : naming two objects [unfilled] / 2 [Repeating a Sentence ___ / 1] : repeating a sentence [unfilled] / 1 [Writing a Sentence ___ / 1] : write sentence [unfilled] / 1 [3-stage Verbal Command ___ / 3] : three-stage verbal command [unfilled] / 3 [Written Command ___ / 1] : written command [unfilled] / 1 [Copy a Design ___ / 1] : copy a design [unfilled] / 1 [Recall ___ / 3] : recall [unfilled] / 3 [Cranial Nerves Optic (II)] : visual acuity intact bilaterally,  visual fields full to confrontation, pupils equal round and reactive to light [Cranial Nerves Oculomotor (III)] : extraocular motion intact [Cranial Nerves Trigeminal (V)] : facial sensation intact symmetrically [Cranial Nerves Facial (VII)] : face symmetrical [Cranial Nerves Vestibulocochlear (VIII)] : hearing was intact bilaterally [Cranial Nerves Glossopharyngeal (IX)] : tongue and palate midline [Cranial Nerves Accessory (XI - Cranial And Spinal)] : head turning and shoulder shrug symmetric [Cranial Nerves Hypoglossal (XII)] : there was no tongue deviation with protrusion [Motor Strength] : muscle strength was normal in all four extremities [Involuntary Movements] : no involuntary movements were seen [Motor Handedness Right-Handed] : the patient is right hand dominant [No Muscle Atrophy] : normal bulk in all four extremities [Sensation Tactile Decrease] : light touch was intact [Sensation Pain / Temperature Decrease] : pain and temperature was intact [Sensation Vibration Decrease] : vibration was intact [Proprioception] : proprioception was intact [Balance] : balance was intact [2+] : Ankle jerk left 2+ [Sclera] : the sclera and conjunctiva were normal [PERRL With Normal Accommodation] : pupils were equal in size, round, reactive to light, with normal accommodation [Extraocular Movements] : extraocular movements were intact [Optic Disc Abnormality] : the optic disc were normal in size and color [No APD] : no afferent pupillary defect [No JOHNNY] : no internuclear ophthalmoplegia [Full Visual Field] : full visual field [Outer Ear] : the ears and nose were normal in appearance [Oropharynx] : the oropharynx was normal [Neck Appearance] : the appearance of the neck was normal [Neck Cervical Mass (___cm)] : no neck mass was observed [Jugular Venous Distention Increased] : there was no jugular-venous distention [Thyroid Diffuse Enlargement] : the thyroid was not enlarged [Thyroid Nodule] : there were no palpable thyroid nodules [Auscultation Breath Sounds / Voice Sounds] : lungs were clear to auscultation bilaterally [Heart Rate And Rhythm] : heart rate was normal and rhythm regular [Heart Sounds] : normal S1 and S2 [Heart Sounds Gallop] : no gallops [Murmurs] : no murmurs [Heart Sounds Pericardial Friction Rub] : no pericardial rub [Arterial Pulses Carotid] : carotid pulses were normal with no bruits [Full Pulse] : the pedal pulses are present [Edema] : there was no peripheral edema [Bowel Sounds] : normal bowel sounds [Abdomen Soft] : soft [Abdomen Tenderness] : non-tender [Abdomen Mass (___ Cm)] : no abdominal mass palpated [No CVA Tenderness] : no ~M costovertebral angle tenderness [No Spinal Tenderness] : no spinal tenderness [Abnormal Walk] : normal gait [Nail Clubbing] : no clubbing  or cyanosis of the fingernails [Musculoskeletal - Swelling] : no joint swelling seen [Motor Tone] : muscle strength and tone were normal [Skin Color & Pigmentation] : normal skin color and pigmentation [Skin Turgor] : normal skin turgor [] : no rash [Place] : disoriented to place [Time] : disoriented to time [Repeating Phrases] : difficulty repeating a phrase [Writing A Sentence] : difficulty writing a sentence [Reading] : difficulty reading [Current Events] : inadequate knowledge of current events [Motor Strength Upper Extremities Bilaterally] : strength was normal in both upper extremities [Motor Strength Lower Extremities Bilaterally] : strength was normal in both lower extremities [Romberg's Sign] : Romberg's sign was negtive [Allodynia] : no ~T allodynia present [Dysesthesia] : no dysesthesia [Hyperesthesia] : no hyperesthesia [Limited Balance] : balance was intact [Past-pointing] : there was no past-pointing [Tremor] : no tremor present [Dysdiadochokinesia Bilaterally] : not present [Coordination - Dysmetria Impaired Finger-to-Nose Bilateral] : not present [Coordination - Dysmetria Impaired Heel-to-Shin Bilateral] : not present [Plantar Reflex Right Only] : normal on the right [Plantar Reflex Left Only] : normal on the left [FreeTextEntry6] : Mild increased tone in LUE. [FreeTextEntry4] : Presidents: 0/5\par Alt pattern intact.\par Clock: 1/3. [FreeTextEntry8] : short steps, mild shuffle

## 2022-06-13 NOTE — HISTORY OF PRESENT ILLNESS
[FreeTextEntry1] : NO COVID.\par COVID VACCINE FULL.\par \par HPI-Interval Hx 20220613:\par Pt last seen in 6/2021.\par Risperidone dc.\par Trazodone now 25mg BID (?).\par Health wise, she is stable.\par ADL limited, uses diapers and needs to be fed. \par Since dc Risperidone she is more mobile and active. \par Mood seems stable, no much agitation, only upset at times when she refuses to eat, but overall ok.\par Remembers people who are around here, but that's the extent of her memory.\par \par \par HPI-Interval Hx 20210607:\par pt had a few falls in April, one with broken nose.\par Also got PPM put in in April. \par Likely bradycardia with heart block.\par Also started on Risperidone, now 1mg BID.\par Appetite ok, but she has lost weight.\par Sleep: takes a long time to fall asleep.\par More restless, per daughter there is some shuffle.\par \par \par HPI-Interval Hx [id number]:\par pt here with daughter for follow-up\par Over the last few months, she has declined significantly\par She has mis-recognition of her own home and often asks to leave; she was found wandering outside, and was brought back by passers-by in a few occasions\par She confuses night and day, but overall sleeps well\par Appetite is stable, she needs to be reminded to eat, no change in weight\par STM is poorer as well\par Communication is ok, speech preserved.\par Motor: slower, more careful with steps; one fall a month ago, tripped; more sedentary lately, does not like to move much\par She sometimes does not recognize familiar people around her.\par Psych: tends to get upset easily, but no overt agitation, nor productive symptoms. \par \par ADL: limited to eat, dressing and washing\par IADL: poor; can use the TV.\par CDR: 2.0.\par \par \par HPI-Interval Hx [id number]:\par NO COVID.\par \par Since last seen, pt has progressed, STM is worse, she forgets she eats and wants to eat again.\par She has not tolerated Rivastigmine, gave her a rash. Started on Donepezil 10mg again, no AE.\par She was prescribed Risperidone (?mg), for agitation, never started. In one occasion she was upset with the aid.\par Delusions to have seen dead people, unclear if actual hallucinations. \par Sleep: stable.\par Appetite: wants fresh food, and likes to eat.\par Motor: intact.\par ADl: needs prompting for washing; recent UTI with some incontinence and using wrong place to urinate\par IADL: limited; cannot manage medications; cannot cook. \par CDR: 1-1.5.\par \par \par HPI-Interval Hx [id number]:\par Had cataract sx in 2/2019, but does not recall that.\par She feels well, and has no conscience of being forgetful. At times, she has forgot name of grandson.\par Home attendant changed, with better social interaction now.\par Sleep: she tends to wake up and start doing things around the house. No daytime naps.\par Appetite: intact, gained 2lb. \par Motor: ok, no issues. \par Per wife, pt likes to drink wine, may go out and get wine for herself from time to time. She is mostly with the HHA, but spends a few hours a day alone, and in one occasion she has roamed out, was supposed to get her phone fixed but never got to the shop. \par She has minor itch from the patch, managed with anti-H cream.\par \par HPI-Interval Hx [id number]:\par Pt doing fine. She was started on Trazodone 50mg.\par They have not looked into the trials at [facility], daughter works and pt does not want to.\par No AE from meds. \par Eats and sleeps well, bug she might forget to eat her meals at times. \par Per daughter, she has got lost in the last few months a few times, walking in the neighborhood. \par \par HPI-Interval Hx [id number]:\par Did not comply with Aricept, now on Rivastigmine Patch 13.3mg. \par Overall doing well, no AE. Eating well.\par Sleeps overall well, at times she wakes up at random times.\par \par MRI brain reviewed with daughter, showing mixed pathology, mild vascular, and some atrophy in bilateral temporal lobes. \par \par HPI-Interval Hx [id number]:\par MRI, labs and US carotids reviewed. Significant atrophy of FTP lobes. \par Still very active, partly lives with her daughter a few days a week.\par Rest is stable.\par \par PMH:\par 84yo RH HW, HTN, HLD, glaucoma, here for evaluation of dementia. \par Family and PMD have noticed forgetfulness for c.a. 2y.  Unclear how it started, pt says she is fine, daughter does not live with her.\par Currently, she appears to be disoriented at times, she has issues with organization skills, she forgets dates, at times names. \par ADl: intact.\par IADL: needs help for shiela etc.\par Does not drive.\par Sleep: takes Trazodone, at times difficulty falling asleep.\par Mood: no issues. No anxiety.\par Former jose.

## 2022-06-13 NOTE — ASSESSMENT
[FreeTextEntry1] : Assessment: \par 89yo RH HW, progressive cognitive decline, due to Alzheimer's disease.\par Significant progression, with cognitive and functional decline. \par Doing much better since off Risperidone.\par Still, cognition declined a bit, limited conversation and naming of close contacts. \par Motor much better.\par \par Ipression: \par -LOAD\par -deconditioning\par -agitation-some aggressive behavior with HHA\par \par Plan: \par -no changes in meds for now.\par \par \par A thorough discussion was entertained with the patient/caregiver regarding the use of psychoactive medications, their possible benefits and AE profile, including the risk of cardiovascular complications, including but not limited to applicable black box warning and teratogenicity, where appropriate.\par We discussed the benefits of being active, physically and mentally, and the need to to establish a routine in this respect.\par Driving abilities and firearms possession and use were discussed, in relation to progression of the cognitive decline, and the need to assess them periodically.\par Patient/caregiver advised to bring previous records to this office.\par All questions were answered at the time of the visit. We are certainly available for further discussion as needed.\par Patient/caregiver fully understands and agree with the plan.\par

## 2022-06-21 ENCOUNTER — NON-APPOINTMENT (OUTPATIENT)
Age: 87
End: 2022-06-21

## 2022-06-27 NOTE — PATIENT PROFILE ADULT - NSPROEXTENSIONSOFSELF_GEN_A_NUR
Remote transmission received from patient's CRT-D home monitor. Transmission shows normal sensing and pacing function. NSVT noted (Coreg). AP 87.5%, CRT 99.1%, Effective 98.6%, VSRp 0.3%    Optivol is within normal range. TriageHF Heart Failure Risk Status on 27-Jun-2022 is Low*    NP will review. See interrogation under cardiology tab in the [address] [place] [address] [address] field for more details. Will continue to monitor remotely. (End of 31-day monitoring period 6/27/22, HF diagnostics). dentures/eyeglasses

## 2022-09-12 ENCOUNTER — APPOINTMENT (OUTPATIENT)
Dept: GERIATRICS | Facility: CLINIC | Age: 87
End: 2022-09-12

## 2022-09-12 ENCOUNTER — NON-APPOINTMENT (OUTPATIENT)
Age: 87
End: 2022-09-12

## 2022-09-12 VITALS
HEART RATE: 93 BPM | RESPIRATION RATE: 16 BRPM | OXYGEN SATURATION: 97 % | SYSTOLIC BLOOD PRESSURE: 124 MMHG | WEIGHT: 102 LBS | DIASTOLIC BLOOD PRESSURE: 76 MMHG | TEMPERATURE: 97.3 F | BODY MASS INDEX: 18.66 KG/M2

## 2022-09-12 DIAGNOSIS — Z23 ENCOUNTER FOR IMMUNIZATION: ICD-10-CM

## 2022-09-12 PROCEDURE — 99214 OFFICE O/P EST MOD 30 MIN: CPT | Mod: 25

## 2022-09-12 PROCEDURE — 90677 PCV20 VACCINE IM: CPT

## 2022-09-12 PROCEDURE — G0009: CPT

## 2022-09-12 PROCEDURE — G0444 DEPRESSION SCREEN ANNUAL: CPT | Mod: 59

## 2022-09-12 PROCEDURE — 93000 ELECTROCARDIOGRAM COMPLETE: CPT | Mod: 59

## 2022-09-12 NOTE — ASSESSMENT
[FreeTextEntry1] : BPSD now manageable, except resistant to feeding and bathing sometimes. \par - Education, counseling, support provided today\par - Behavioral interventions\par - c/w Trazodone 50mg QHS\par \par - EKG done and reviewed today: pacemaker rhythm - stable \par \par - c/w 24/7 supervision for safety and assistance w/ ADLs\par - Aspiration precautions\par - encourage/assist PO intake as tolerated\par - c/w comfort feeds when pt cooperative, adv not to force feed\par Fall precautions\par \par - ADC program discussed and dtr agrees to schedule initial appt with ALEJA Quijano\par \par - discussed r/b/a of Prevnar and dtr wishes for pt to get today\par - RN administered at today's visit\par \par AWV at next visit \par \par f/u in 3 mo, unless earlier PRN \par

## 2022-09-12 NOTE — HISTORY OF PRESENT ILLNESS
[Patient reported vision is normal] : Patient reported vision is normal [Patient declined colon rectal/cancer screening] : Patient declined colon/rectal cancer screening [Patient declined skin cancer screening] : Patient declined skin cancer screening [Patient declined breast sonogram] : Patient declined breast sonogram [Patient declined cervical cancer screening] : Patient declined cervical cancer screening [Completely Dependent] : Completely dependent. [Full assistance needed] : Assistance needed managing medications [FAST Score: ____] : Functional Assessment Scale (FAST) Score: [unfilled] [Smoke Detector] : smoke detector [Carbon Monoxide Detector] : carbon monoxide detector [Other reason not done] : Other reason not done [Severe] : Stage: Severe [Worse] : Status: Worse [Memory Lapses Or Loss] : worsened memory impairment [Patient Observed To Be Agitated] : improved agitation [Hostility Toward Caregivers] : improved aggression [Sleep Disturbances] : improved sleep disturbances [] : improved wandering [Fixed Beliefs Contradicted By Reality (Delusions)] : denies delusions [Difficulty Finding Desired Words] : stable difficulty finding desired words [Designated Healthcare Proxy] : Designated healthcare proxy [1] : 1) Little interest or pleasure doing things for several days (1) [0] : 2) Feeling down, depressed, or hopeless: Not at all (0) [PHQ-2 Negative - No further assessment needed] : PHQ-2 Negative - No further assessment needed [No falls in past year] : Patient reported no falls in the past year [FreeTextEntry1] : No acute events since last visit including falls, hospitalizations, ED visits, urgent care visits.\par \par TODAY pt denies complaints however limited historian d/t baseline confusion/memory loss. \par \par Ambulates with assistance.\par Sleeping well with Trazodone 50mg QHS. \par \par Chronic +UI and FI. \par No c/o pain. \par Has 24/7 HHA help. \par \par DEMENTIA / h/o SDH\par - 6/22: Pt doing better overall. Off hospice now. Eating better and gained 10 lbs since last visit. Eats pureed diet. \par - 9/21: Hospitalized in 8/21for SDH s/p fall. Repeat CTH unchanged, baseline mental status. Family and HCP does not want neurosurgical interventions. R shoulder pain after fall, no fractures on x ray but patient unable to lift arm. Xray's negative. CT scan demonstrating no acute bony disruption, likely subacromial bursitis. Patient s/p recent shoulder arthroscopy, unknown procedure performed at OSH.\par Advised outpatient follow-up with primary surgeon upon discharge. No further w/u required this admission. Can wear sling for comfort as needed, but would advise passive ROM exercises daily. R elbow pain after fall, full ROM, no point tenderness, no ecchymosis. xray negative for fracture.\par \par - MOCA 0/30 on 8/23/21 and MMSE 5/30 in Aug '21 with   \par - MRI brain 2017: not impressive \par \par [x] Neuro Dr. Barry - imp: LOAD - last seen 6/13/22 - visit note appreciated in EMR \par [ ] Psych\par \par - Previously on exelon - stopped d/t rash \par - Previously on Risperidone - tapered off d/t more sleepy/calm after hospitalization in 8/21\par - Previously on Aricept - stopped when enrolled into hospice \par - Takes Trazodone\par \par HTN / CHB s/p PPM in May '21\par - TTE may '21: LVEF 45%\par - follows w/ cards Dr. Monte, and Dr. Connor Royal for EP/PPM checks  [TextBox_31] : was rec hearing aids but pending f/u with audiologist  [TextBox_37] : Dr. Mary Adrian last seen in 2020, due for f/u  [FreeTextEntry7] : follows w/ podiatrist for onychomycosis  [Driving Concerns] : not driving or driving without noted concerns [[facility]] : >12 sec  [FreeTextEntry8] : UI since hospitalization in April '21, and FI+ [de-identified] : dtr manages  [de-identified] : PHQ2 of 1 on 9/12/22 [[id number]] : 1 [AdvancecareDate] : 9/13/21  [FreeTextEntry4] : HCP form on file: dtr Sara is primary HCP, alternate is Sylvain\par MOLST on file: DNR, DNI, limited med interventions, NFT, send to hospital, limitation of abx \par Dtr states pt would not want aggressive interventions at EOL.  Hospice at home discussed - dtr wishes for eval

## 2022-09-12 NOTE — REASON FOR VISIT
[Follow-Up] : a follow-up visit [Formal Caregiver] : formal caregiver [Family Member] : family member [FreeTextEntry1] : dementia, insomnia, agitation [FreeTextEntry3] : JOSE Guerrero, and veronica Ruiz via telephone   [FreeTextEntry2] : who assist with history d/t pt confused at baseline

## 2022-09-12 NOTE — PHYSICAL EXAM
[EOMI] : extraocular movements were intact [Heart Rate And Rhythm] : heart rate was normal and rhythm regular [Edema] : edema was not present [Pedal Pulses Normal] : the pedal pulses are present [Normal] : normal skin color and pigmentation [Normal Hearing] : hearing was not normal [Normal Gait] : abnormal gait [Normal Insight/Judgment] : insight and judgment were not intact [de-identified] : L chest PPM [de-identified] : slow unsteady gait , generalized weakness  [de-identified] : alert, unsteady gait, minimally verbal  [de-identified] : calm, cooperative , confused

## 2022-11-02 ENCOUNTER — NON-APPOINTMENT (OUTPATIENT)
Age: 87
End: 2022-11-02

## 2022-11-08 RX ORDER — LISINOPRIL 10 MG/1
10 TABLET ORAL
Qty: 90 | Refills: 1 | Status: DISCONTINUED | COMMUNITY
Start: 2021-08-10 | End: 2022-11-08

## 2022-12-26 ENCOUNTER — RX RENEWAL (OUTPATIENT)
Age: 87
End: 2022-12-26

## 2023-02-06 ENCOUNTER — APPOINTMENT (OUTPATIENT)
Dept: GERIATRICS | Facility: CLINIC | Age: 88
End: 2023-02-06

## 2023-02-06 ENCOUNTER — APPOINTMENT (OUTPATIENT)
Dept: NEUROLOGY | Facility: CLINIC | Age: 88
End: 2023-02-06
Payer: MEDICARE

## 2023-02-06 VITALS
HEIGHT: 62 IN | WEIGHT: 113 LBS | BODY MASS INDEX: 20.8 KG/M2 | DIASTOLIC BLOOD PRESSURE: 76 MMHG | SYSTOLIC BLOOD PRESSURE: 179 MMHG | HEART RATE: 91 BPM

## 2023-02-06 PROCEDURE — 99214 OFFICE O/P EST MOD 30 MIN: CPT

## 2023-02-06 NOTE — PHYSICAL EXAM
[General Appearance - Alert] : alert [General Appearance - In No Acute Distress] : in no acute distress [Oriented To Time, Place, And Person] : oriented to person, place, and time [Impaired Insight] : insight and judgment were intact [Affect] : the affect was normal [Person] : oriented to person [Concentration Intact] : normal concentrating ability [Visual Intact] : visual attention was ~T not ~L decreased [Naming Objects] : no difficulty naming common objects [Fluency] : fluency intact [Comprehension] : comprehension intact [Past History] : adequate knowledge of personal past history [Vocabulary] : adequate range of vocabulary [Cranial Nerves Optic (II)] : visual acuity intact bilaterally,  visual fields full to confrontation, pupils equal round and reactive to light [Cranial Nerves Oculomotor (III)] : extraocular motion intact [Cranial Nerves Trigeminal (V)] : facial sensation intact symmetrically [Cranial Nerves Facial (VII)] : face symmetrical [Cranial Nerves Vestibulocochlear (VIII)] : hearing was intact bilaterally [Cranial Nerves Glossopharyngeal (IX)] : tongue and palate midline [Cranial Nerves Accessory (XI - Cranial And Spinal)] : head turning and shoulder shrug symmetric [Cranial Nerves Hypoglossal (XII)] : there was no tongue deviation with protrusion [Motor Strength] : muscle strength was normal in all four extremities [Involuntary Movements] : no involuntary movements were seen [No Muscle Atrophy] : normal bulk in all four extremities [Motor Handedness Right-Handed] : the patient is right hand dominant [Sensation Tactile Decrease] : light touch was intact [Sensation Pain / Temperature Decrease] : pain and temperature was intact [Sensation Vibration Decrease] : vibration was intact [Proprioception] : proprioception was intact [Balance] : balance was intact [2+] : Ankle jerk left 2+ [Sclera] : the sclera and conjunctiva were normal [PERRL With Normal Accommodation] : pupils were equal in size, round, reactive to light, with normal accommodation [Extraocular Movements] : extraocular movements were intact [No APD] : no afferent pupillary defect [Optic Disc Abnormality] : the optic disc were normal in size and color [No JOHNNY] : no internuclear ophthalmoplegia [Full Visual Field] : full visual field [Outer Ear] : the ears and nose were normal in appearance [Oropharynx] : the oropharynx was normal [Neck Appearance] : the appearance of the neck was normal [Neck Cervical Mass (___cm)] : no neck mass was observed [Jugular Venous Distention Increased] : there was no jugular-venous distention [Thyroid Diffuse Enlargement] : the thyroid was not enlarged [Thyroid Nodule] : there were no palpable thyroid nodules [Auscultation Breath Sounds / Voice Sounds] : lungs were clear to auscultation bilaterally [Heart Rate And Rhythm] : heart rate was normal and rhythm regular [Heart Sounds] : normal S1 and S2 [Heart Sounds Gallop] : no gallops [Murmurs] : no murmurs [Heart Sounds Pericardial Friction Rub] : no pericardial rub [Arterial Pulses Carotid] : carotid pulses were normal with no bruits [Full Pulse] : the pedal pulses are present [Edema] : there was no peripheral edema [Bowel Sounds] : normal bowel sounds [Abdomen Soft] : soft [Abdomen Tenderness] : non-tender [Abdomen Mass (___ Cm)] : no abdominal mass palpated [No CVA Tenderness] : no ~M costovertebral angle tenderness [No Spinal Tenderness] : no spinal tenderness [Abnormal Walk] : normal gait [Nail Clubbing] : no clubbing  or cyanosis of the fingernails [Musculoskeletal - Swelling] : no joint swelling seen [Motor Tone] : muscle strength and tone were normal [Skin Color & Pigmentation] : normal skin color and pigmentation [Skin Turgor] : normal skin turgor [] : no rash [FreeTextEntry1] : Exam stable. [Place] : disoriented to place [Time] : disoriented to time [Repeating Phrases] : difficulty repeating a phrase [Writing A Sentence] : difficulty writing a sentence [Reading] : difficulty reading [Current Events] : inadequate knowledge of current events [Motor Strength Upper Extremities Bilaterally] : strength was normal in both upper extremities [Motor Strength Lower Extremities Bilaterally] : strength was normal in both lower extremities [Romberg's Sign] : Romberg's sign was negtive [Allodynia] : no ~T allodynia present [Dysesthesia] : no dysesthesia [Hyperesthesia] : no hyperesthesia [Limited Balance] : balance was intact [Past-pointing] : there was no past-pointing [Tremor] : no tremor present [Dysdiadochokinesia Bilaterally] : not present [Coordination - Dysmetria Impaired Heel-to-Shin Bilateral] : not present [Coordination - Dysmetria Impaired Finger-to-Nose Bilateral] : not present [Plantar Reflex Right Only] : normal on the right [Plantar Reflex Left Only] : normal on the left [FreeTextEntry4] : Presidents: 0/5\par Alt pattern intact.\par Clock: 1/3. [FreeTextEntry6] : Mild increased tone in LUE. [FreeTextEntry8] : short steps

## 2023-02-06 NOTE — HISTORY OF PRESENT ILLNESS
[FreeTextEntry1] : NO COVID.\par COVID VACCINE FULL.\par \par \par HPI-Interval Hx 20230206:\par since last seen, Trazodone resumed, given change in behavior.\par Eats well, but uses blended food. Has gained a bit of weight.\par Wears a diaper now.\par ADL reduced, can eat by herself, but for the rest she needs to be followed.\par IADL poor.\par Sleep ok.\par No matt agitation, rare tantrums. Sings a lot. \par Motor ok, slow and cautioned, but overall moves well. \par \par \par HPI-Interval Hx 20220613:\par Pt last seen in 6/2021.\par Risperidone dc.\par Trazodone now 25mg BID (?).\par Health wise, she is stable.\par ADL limited, uses diapers and needs to be fed. \par Since dc Risperidone she is more mobile and active. \par Mood seems stable, no much agitation, only upset at times when she refuses to eat, but overall ok.\par Remembers people who are around here, but that's the extent of her memory.\par \par \par HPI-Interval Hx 20210607:\par pt had a few falls in April, one with broken nose.\par Also got PPM put in in April. \par Likely bradycardia with heart block.\par Also started on Risperidone, now 1mg BID.\par Appetite ok, but she has lost weight.\par Sleep: takes a long time to fall asleep.\par More restless, per daughter there is some shuffle.\par \par \par HPI-Interval Hx [id number]:\par pt here with daughter for follow-up\par Over the last few months, she has declined significantly\par She has mis-recognition of her own home and often asks to leave; she was found wandering outside, and was brought back by passers-by in a few occasions\par She confuses night and day, but overall sleeps well\par Appetite is stable, she needs to be reminded to eat, no change in weight\par STM is poorer as well\par Communication is ok, speech preserved.\par Motor: slower, more careful with steps; one fall a month ago, tripped; more sedentary lately, does not like to move much\par She sometimes does not recognize familiar people around her.\par Psych: tends to get upset easily, but no overt agitation, nor productive symptoms. \par \par ADL: limited to eat, dressing and washing\par IADL: poor; can use the TV.\par CDR: 2.0.\par \par \par HPI-Interval Hx [id number]:\par NO COVID.\par \par Since last seen, pt has progressed, STM is worse, she forgets she eats and wants to eat again.\par She has not tolerated Rivastigmine, gave her a rash. Started on Donepezil 10mg again, no AE.\par She was prescribed Risperidone (?mg), for agitation, never started. In one occasion she was upset with the aid.\par Delusions to have seen dead people, unclear if actual hallucinations. \par Sleep: stable.\par Appetite: wants fresh food, and likes to eat.\par Motor: intact.\par ADl: needs prompting for washing; recent UTI with some incontinence and using wrong place to urinate\par IADL: limited; cannot manage medications; cannot cook. \par CDR: 1-1.5.\par \par \par HPI-Interval Hx [id number]:\par Had cataract sx in 2/2019, but does not recall that.\par She feels well, and has no conscience of being forgetful. At times, she has forgot name of grandson.\par Home attendant changed, with better social interaction now.\par Sleep: she tends to wake up and start doing things around the house. No daytime naps.\par Appetite: intact, gained 2lb. \par Motor: ok, no issues. \par Per wife, pt likes to drink wine, may go out and get wine for herself from time to time. She is mostly with the HHA, but spends a few hours a day alone, and in one occasion she has roamed out, was supposed to get her phone fixed but never got to the shop. \par She has minor itch from the patch, managed with anti-H cream.\par \par HPI-Interval Hx [id number]:\par Pt doing fine. She was started on Trazodone 50mg.\par They have not looked into the trials at [facility], daughter works and pt does not want to.\par No AE from meds. \par Eats and sleeps well, bug she might forget to eat her meals at times. \par Per daughter, she has got lost in the last few months a few times, walking in the neighborhood. \par \par HPI-Interval Hx [id number]:\par Did not comply with Aricept, now on Rivastigmine Patch 13.3mg. \par Overall doing well, no AE. Eating well.\par Sleeps overall well, at times she wakes up at random times.\par \par MRI brain reviewed with daughter, showing mixed pathology, mild vascular, and some atrophy in bilateral temporal lobes. \par \par HPI-Interval Hx [id number]:\par MRI, labs and US carotids reviewed. Significant atrophy of FTP lobes. \par Still very active, partly lives with her daughter a few days a week.\par Rest is stable.\par \par PMH:\par 82yo RH HW, HTN, HLD, glaucoma, here for evaluation of dementia. \par Family and PMD have noticed forgetfulness for c.a. 2y.  Unclear how it started, pt says she is fine, daughter does not live with her.\par Currently, she appears to be disoriented at times, she has issues with organization skills, she forgets dates, at times names. \par ADl: intact.\par IADL: needs help for shiela etc.\par Does not drive.\par Sleep: takes Trazodone, at times difficulty falling asleep.\par Mood: no issues. No anxiety.\par Former jose.

## 2023-02-06 NOTE — ASSESSMENT
[FreeTextEntry1] : Assessment: \par 88yo RH HW, progressive cognitive decline, due to Alzheimer's disease.\par Significant progression, with cognitive and functional decline. \par Better since off Risperidone and back on Trazodone.\par She is a bit afraid of falling, limiting some how her movements.\par \par Impression: \par -LOAD\par -deconditioning\par \par Plan: \par -no changes in meds\par -continue with as much activity as possible.\par \par \par A thorough discussion was entertained with the patient/caregiver regarding the use of psychoactive medications, their possible benefits and AE profile, including the risk of cardiovascular complications, including but not limited to applicable black box warning and teratogenicity, where appropriate.\par We discussed the benefits of being active, physically and mentally, and the need to to establish a routine in this respect.\par Driving abilities and firearms possession and use were discussed, in relation to progression of the cognitive decline, and the need to assess them periodically.\par Patient/caregiver advised to bring previous records to this office.\par All questions were answered at the time of the visit. We are certainly available for further discussion as needed.\par Patient/caregiver fully understands and agree with the plan.\par

## 2023-04-26 ENCOUNTER — APPOINTMENT (OUTPATIENT)
Dept: GERIATRICS | Facility: CLINIC | Age: 88
End: 2023-04-26
Payer: MEDICARE

## 2023-04-26 VITALS
OXYGEN SATURATION: 96 % | DIASTOLIC BLOOD PRESSURE: 88 MMHG | SYSTOLIC BLOOD PRESSURE: 162 MMHG | BODY MASS INDEX: 21.22 KG/M2 | HEART RATE: 94 BPM | TEMPERATURE: 98 F | WEIGHT: 116 LBS | RESPIRATION RATE: 15 BRPM

## 2023-04-26 VITALS — DIASTOLIC BLOOD PRESSURE: 84 MMHG | SYSTOLIC BLOOD PRESSURE: 154 MMHG

## 2023-04-26 DIAGNOSIS — R82.90 UNSPECIFIED ABNORMAL FINDINGS IN URINE: ICD-10-CM

## 2023-04-26 DIAGNOSIS — Z86.39 PERSONAL HISTORY OF OTHER ENDOCRINE, NUTRITIONAL AND METABOLIC DISEASE: ICD-10-CM

## 2023-04-26 DIAGNOSIS — R15.9 FULL INCONTINENCE OF FECES: ICD-10-CM

## 2023-04-26 DIAGNOSIS — Z13.21 ENCOUNTER FOR SCREENING FOR NUTRITIONAL DISORDER: ICD-10-CM

## 2023-04-26 PROCEDURE — 99215 OFFICE O/P EST HI 40 MIN: CPT

## 2023-04-26 RX ORDER — AMMONIUM LACTATE 12 %
12 CREAM (GRAM) TOPICAL
Qty: 140 | Refills: 0 | Status: DISCONTINUED | COMMUNITY
Start: 2021-03-23 | End: 2023-04-26

## 2023-04-26 NOTE — ASSESSMENT
[FreeTextEntry1] : BPSD now manageable and sleeping well\par - c/w behavioral interventions\par - c/w Trazodone 50mg QHS\par \par - c/w 24/7 supervision and assistance w/ ADLs\par \par Wt trending up\par - Cont to encourage/assist PO intake\par - c/w comfort feeds when pt cooperative\par - Aspiration precautions\par \par - ADC program discussed and pending initial appt with ALEJA Mckee\par \par BP high today \par - c/w Enalapril for now, monitor BP and will consider adjustment of dose at next visit if needed \par - will check labs and urine studies -r/o UTI? Ordered home blood draw and specimen cup given today\par \par Will assist with obtaining incontinence supplies - [facility] - scripts for Pullups, bed chux, gloves, wipes, and A&D ointment\par \par AWV at next visit \par \par f/u within 3 mo, unless earlier PRN \par

## 2023-04-26 NOTE — REASON FOR VISIT
[Follow-Up] : a follow-up visit [Formal Caregiver] : formal caregiver [Family Member] : family member [FreeTextEntry1] : foul smelling urine, UI, dementia [FreeTextEntry3] : JOSE Guerrero, and veronica Ruiz via telephone   [FreeTextEntry2] : who assist with history d/t pt confused at baseline

## 2023-04-26 NOTE — REVIEW OF SYSTEMS
[Loss Of Hearing] : hearing loss [Incontinence] : incontinence [Negative] : Heme/Lymph [As Noted in HPI] : as noted in HPI [FreeTextEntry3] : wears glasses [FreeTextEntry7] : FI +

## 2023-04-26 NOTE — SOCIAL HISTORY
[Alone] : lives alone [Apartment] : in an apartment [Female] : Female [FreeTextEntry1] : 2x12h [facility]  [FreeTextEntry4] : via Medicaid

## 2023-04-26 NOTE — HISTORY OF PRESENT ILLNESS
[Patient reported vision is normal] : Patient reported vision is normal [Patient declined colon rectal/cancer screening] : Patient declined colon/rectal cancer screening [Patient declined skin cancer screening] : Patient declined skin cancer screening [Patient declined breast sonogram] : Patient declined breast sonogram [Patient declined cervical cancer screening] : Patient declined cervical cancer screening [No falls in past year] : Patient reported no falls in the past year [Completely Dependent] : Completely dependent. [Full assistance needed] : Assistance needed managing medications [FAST Score: ____] : Functional Assessment Scale (FAST) Score: [unfilled] [Smoke Detector] : smoke detector [Carbon Monoxide Detector] : carbon monoxide detector [1] : 1) Little interest or pleasure doing things for several days (1) [0] : 2) Feeling down, depressed, or hopeless: Not at all (0) [Other reason not done] : Other reason not done [PHQ-2 Negative - No further assessment needed] : PHQ-2 Negative - No further assessment needed [Severe] : Stage: Severe [Patient Observed To Be Agitated] : improved agitation [Hostility Toward Caregivers] : improved aggression [Sleep Disturbances] : improved sleep disturbances [] : improved wandering [Fixed Beliefs Contradicted By Reality (Delusions)] : denies delusions [Difficulty Finding Desired Words] : stable difficulty finding desired words [Designated Healthcare Proxy] : Designated healthcare proxy [Stable] : Status: Stable [Memory Lapses Or Loss] : stable memory impairment [DNR] : DNR [DNI] : DNI [FreeTextEntry1] : No acute events since last visit including falls, hospitalizations, ED visits, urgent care visits.\par \par TODAY pt denies complaints however limited historian d/t baseline confusion/memory loss. \par \par Lives with 24/7 HHA assistance.\par Daughter lives in [place].\par \par Request script for incontinence supplies through [employer] - \par [facility] - requests scripts for Pullups, bed chux, gloves, wipes, and A&D ointment. \par \par Urine sometimes smells strong - only one of the HHAs, the other HHAs tell dtr that urine is fine. \par Chronic +UI and FI. \par \par Ambulates with assistance.\par \par Sleeping well with Trazodone 50mg QHS. \par - Previously on Risperidone - tapered off d/t improvement in sleep after hospitalization in 8/21\par \par Appetite: good, weight stable \par \par BPS: behaviors manageable, mood generally good\par \par DEMENTIA / h/o SDH\par - 6/22: Pt doing better overall. Off hospice now. Eating better and gained 10 lbs since last visit. Eats pureed diet. \par - 9/21: Hospitalized in 8/21for SDH s/p fall. Repeat CTH unchanged, baseline mental status. Family and HCP does not want neurosurgical interventions. R shoulder pain after fall, no fractures on x ray but patient unable to lift arm. Xray's negative. CT scan demonstrating no acute bony disruption, likely subacromial bursitis. Patient s/p recent shoulder arthroscopy, unknown procedure performed at OSH.\par Advised outpatient follow-up with primary surgeon upon discharge. No further w/u required this admission. Can wear sling for comfort as needed, but would advise passive ROM exercises daily. R elbow pain after fall, full ROM, no point tenderness, no ecchymosis. xray negative for fracture.\par \par - MOCA 0/30 on 8/23/21 and MMSE 5/30 in Aug '21 with   \par - MRI brain 2017: not impressive \par \par [x] Neuro Dr. Barry - imp: LOAD - seen 2/23 - visit note appreciated in EMR \par [ ] Psych\par \par - Previously on exelon - stopped d/t rash \par - Previously on Aricept - stopped when enrolled into hospice \par \par HTN / CHB s/p PPM in May '21\par - TTE may '21: LVEF 45%\par - follows w/ cards Dr. Monte, and Dr. Connor Royal for EP/PPM checks  [TextBox_31] : was rec hearing aids, pending f/u with audiologist  [TextBox_37] : Dr. Mary Adrian last seen in 2020, due for f/u  [FreeTextEntry7] : follows w/ podiatrist for onychomycosis  [Driving Concerns] : not driving or driving without noted concerns [[facility]] : >12 sec  [FreeTextEntry8] : UI since hospitalization in April '21, and FI+ [de-identified] : dtr manages  [de-identified] : PHQ2 of 1 on 9/12/22 [[id number]] : 1 [AdvancecareDate] : 4/26/23 [FreeTextEntry4] : HCP form on file: veronica Ruiz is primary HCP, alternate is Sylvain\isac NEWMAN on file: DNR, DNI, limited med interventions, NFT, send to hospital, limitation of abx. Would not want aggressive measures at EOL.

## 2023-04-26 NOTE — PHYSICAL EXAM
[EOMI] : extraocular movements were intact [Heart Rate And Rhythm] : heart rate was normal and rhythm regular [Edema] : edema was not present [Pedal Pulses Normal] : the pedal pulses are present [Normal] : normal skin color and pigmentation [Normal Hearing] : hearing was not normal [Normal Gait] : abnormal gait [Normal Insight/Judgment] : insight and judgment were not intact [de-identified] : L chest PPM [de-identified] : slow unsteady gait , generalized weakness  [de-identified] : alert, unsteady gait, minimally verbal  [de-identified] : calm, cooperative , confused

## 2023-04-27 ENCOUNTER — LABORATORY RESULT (OUTPATIENT)
Age: 88
End: 2023-04-27

## 2023-05-02 LAB
25(OH)D3 SERPL-MCNC: 39.3 NG/ML
ALBUMIN SERPL ELPH-MCNC: 3.8 G/DL
ALP BLD-CCNC: 70 U/L
ALT SERPL-CCNC: 8 U/L
ANION GAP SERPL CALC-SCNC: 14 MMOL/L
APPEARANCE: ABNORMAL
AST SERPL-CCNC: 13 U/L
BILIRUB SERPL-MCNC: 0.3 MG/DL
BILIRUBIN URINE: NEGATIVE
BLOOD URINE: ABNORMAL
BUN SERPL-MCNC: 13 MG/DL
CALCIUM SERPL-MCNC: 9.5 MG/DL
CHLORIDE SERPL-SCNC: 105 MMOL/L
CO2 SERPL-SCNC: 23 MMOL/L
COLOR: YELLOW
CREAT SERPL-MCNC: 0.72 MG/DL
EGFR: 80 ML/MIN/1.73M2
GLUCOSE QUALITATIVE U: NEGATIVE MG/DL
GLUCOSE SERPL-MCNC: 96 MG/DL
KETONES URINE: NEGATIVE MG/DL
LEUKOCYTE ESTERASE URINE: ABNORMAL
NITRITE URINE: NEGATIVE
PH URINE: 7
POTASSIUM SERPL-SCNC: 4.1 MMOL/L
PROT SERPL-MCNC: 6.3 G/DL
PROTEIN URINE: NEGATIVE MG/DL
SODIUM SERPL-SCNC: 142 MMOL/L
SPECIFIC GRAVITY URINE: 1.01
TSH SERPL-ACNC: 1.98 UIU/ML
UROBILINOGEN URINE: 0.2 MG/DL

## 2023-05-03 ENCOUNTER — NON-APPOINTMENT (OUTPATIENT)
Age: 88
End: 2023-05-03

## 2023-05-03 RX ORDER — CIPROFLOXACIN HYDROCHLORIDE 500 MG/1
500 TABLET, FILM COATED ORAL
Qty: 10 | Refills: 0 | Status: DISCONTINUED | COMMUNITY
Start: 2023-05-03 | End: 2023-05-03

## 2023-05-23 ENCOUNTER — APPOINTMENT (OUTPATIENT)
Dept: GERIATRICS | Facility: CLINIC | Age: 88
End: 2023-05-23
Payer: MEDICARE

## 2023-05-23 VITALS
BODY MASS INDEX: 21.71 KG/M2 | HEART RATE: 78 BPM | WEIGHT: 118 LBS | OXYGEN SATURATION: 96 % | SYSTOLIC BLOOD PRESSURE: 160 MMHG | HEIGHT: 62 IN | DIASTOLIC BLOOD PRESSURE: 94 MMHG | TEMPERATURE: 98 F | RESPIRATION RATE: 16 BRPM

## 2023-05-23 DIAGNOSIS — Z71.89 OTHER SPECIFIED COUNSELING: ICD-10-CM

## 2023-05-23 DIAGNOSIS — R63.0 ANOREXIA: ICD-10-CM

## 2023-05-23 DIAGNOSIS — H54.7 UNSPECIFIED VISUAL LOSS: ICD-10-CM

## 2023-05-23 DIAGNOSIS — Z00.00 ENCOUNTER FOR GENERAL ADULT MEDICAL EXAMINATION W/OUT ABNORMAL FINDINGS: ICD-10-CM

## 2023-05-23 PROCEDURE — G0439: CPT

## 2023-05-23 RX ORDER — L.ACIDOPH/L.BULG/B.BIF/S.THERM 1B-250 MG
TABLET ORAL
Qty: 20 | Refills: 0 | Status: DISCONTINUED | COMMUNITY
Start: 2023-05-03 | End: 2023-05-23

## 2023-05-23 RX ORDER — NITROFURANTOIN (MONOHYDRATE/MACROCRYSTALS) 25; 75 MG/1; MG/1
100 CAPSULE ORAL TWICE DAILY
Qty: 10 | Refills: 0 | Status: DISCONTINUED | COMMUNITY
Start: 2023-05-03 | End: 2023-05-23

## 2023-05-23 NOTE — ASSESSMENT
[FreeTextEntry1] : BP high today, although per HHA/dtr SBP <140 at home\par Adv to keep BP log and bring to next visit along with BP machine \par - c/w Enalapril for now, monitor BP and will consider adjustment of dose at next visit if needed \par \par ENT/audiology referral given today - f/u \par Ophtho f/u annually and PRN adv\par \par BPSD now manageable and sleeping well\par - c/w behavioral interventions\par - c/w Trazodone 50mg QHS\par \par s/p course of Abx recently for UTI, now urine/toileting habits at baseline, deny concerns, monitor for s/s of recurrence advised \par Adv timed voiding\par \par Wt trending up\par - Cont to encourage/assist PO intake\par - c/w comfort feeds when pt cooperative\par - Aspiration precautions\par \par - c/w 24/7 supervision and assistance w/ ADLs\par Fall precautions\par \par - ADC program discussed and pending initial appt with ALEJA Mckee\par \par f/u in 3 mo, unless earlier PRN \par

## 2023-05-23 NOTE — REASON FOR VISIT
[Follow-Up] : a follow-up visit [Formal Caregiver] : formal caregiver [Family Member] : family member [Patient Declined  Services] : - None: Patient declined  services [FreeTextEntry1] : h/o UTI, AWV [FreeTextEntry3] : veronica Ruiz [FreeTextEntry2] : who assist with history d/t pt confused at baseline  [TWNoteComboBox1] : [place]

## 2023-05-23 NOTE — HISTORY OF PRESENT ILLNESS
[Patient reported vision is normal] : Patient reported vision is normal [Patient declined colon rectal/cancer screening] : Patient declined colon/rectal cancer screening [Patient declined skin cancer screening] : Patient declined skin cancer screening [Patient declined breast sonogram] : Patient declined breast sonogram [Patient declined cervical cancer screening] : Patient declined cervical cancer screening [No falls in past year] : Patient reported no falls in the past year [Completely Dependent] : Completely dependent. [Full assistance needed] : Assistance needed managing medications [FAST Score: ____] : Functional Assessment Scale (FAST) Score: [unfilled] [Smoke Detector] : smoke detector [Other reason not done] : Other reason not done [Severe] : Stage: Severe [Stable] : Status: Stable [Memory Lapses Or Loss] : stable memory impairment [Fixed Beliefs Contradicted By Reality (Delusions)] : denies delusions [Difficulty Finding Desired Words] : stable difficulty finding desired words [Designated Healthcare Proxy] : Designated healthcare proxy [DNR] : DNR [DNI] : DNI [PMH Reviewed and Updated] : past medical history reviewed and updated [PSH Reviewed and Updated] : past surgical history reviewed and updated [Family History Reviewed and Updated] : family history reviewed and updated [Medication and Allergies Reconciled] : medication and allergies reconciled [0] : 0 [Retired] : retired from work [Smoking Status Reviewed and Updated] : Smoking status was reviewed and updated [None] : The patient has no concerns about alcohol abuse [Never] : has never used illicit drugs [Can not Exercise (Disability)] : Exercise: The patient can not exercise due to disability [Walking] : walking [Adequate] : adequate [Children] : children [Needs total help with ADLs] : need total help with ADLs [Does not drive] : does not drive [Seatbelts] : seatbelts [Smoke Detectors] : smoke detectors [Carbon Monoxide Detector] : carbon monoxide detector [Compliant with medications] : compliant with medications [Unable To Manage Meds] : inability to manage ~his/her~ medications [Advanced Directives Previoulsy Documented] : Advanced Directives was previously documented [Patient Does not Have Full Capacity] : this patient does not have full decision making capacity for discussion of advance care planning [Patient denied dental screening.] : Patient denied dental screening [Wears Seat Belt] : wears seat belt [Patient Observed To Be Agitated] : denies agitation [Hostility Toward Caregivers] : denies aggression [Sleep Disturbances] : denies sleep disturbances [] : denies wandering [Reviewed no changes] : Reviewed, no changes [Over the Past 2 Weeks, Have You Felt Down, Depressed, or Hopeless?] : 1.) Over the past 2 weeks, have you felt down, depressed, or hopeless? No [Over the Past 2 Weeks, Have You Felt Little Interest or Pleasure Doing Things?] : 2.) Over the past 2 weeks, have you felt little interest or pleasure doing things? No [de-identified] : Lives alone with 24/7 HHA help.  [de-identified] : see ACP section [FreeTextEntry1] : No acute events since last visit including falls, hospitalizations, ED visits, urgent care visits.\par \par TODAY pt denies complaints however limited historian d/t baseline confusion/memory loss. \par \par Completed course of Abx. Denies urinary complaints. \par \par HHA states at home BP checks SBP is 130s-140s max. \par \par Lives with 24/7 HHA. \par Daughter lives in [place].\par \par Ambulates with assistance.\par Uses WC for longer distances. \par \par Sleeping well with Trazodone 50mg QHS. \par - Previously on Risperidone - tapered off d/t improvement in sleep after hospitalization in 8/21\par \par Appetite: good, weight trending up\par \par BPS: behaviors manageable, mood generally good\par \par DEMENTIA / h/o SDH\par - 6/22: Pt doing better overall. Off hospice now. Eating better and gained 10 lbs since last visit. Eats pureed diet. \par - 9/21: Hospitalized in 8/21for SDH s/p fall. Repeat CTH unchanged, baseline mental status. Family and HCP does not want neurosurgical interventions. R shoulder pain after fall, no fractures on x ray but patient unable to lift arm. Xray's negative. CT scan demonstrating no acute bony disruption, likely subacromial bursitis. Patient s/p recent shoulder arthroscopy, unknown procedure performed at OSH.\par Advised outpatient follow-up with primary surgeon upon discharge. No further w/u required this admission. Can wear sling for comfort as needed, but would advise passive ROM exercises daily. R elbow pain after fall, full ROM, no point tenderness, no ecchymosis. xray negative for fracture.\par \par - MOCA 0/30 on 8/23/21 and MMSE 5/30 in Aug '21 with   \par - MRI brain 2017: not impressive \par \par [x] Neuro Dr. Barry - imp: LOAD - seen 2/23 - visit note appreciated in EMR \par [ ] Psych\par \par - Previously on exelon - stopped d/t rash \par - Previously on Aricept - stopped d/t focus on comfort measures only \par \par HTN / CHB s/p PPM in May '21\par - TTE may '21: LVEF 45%\par - follows w/ cards Dr. Monte, and Dr. Connor Royal for EP/PPM checks  [TextBox_25] : takes vit D supplement  [TextBox_31] : was rec hearing aids, pending f/u with audiologist  [FreeTextEntry7] : follows w/ podiatrist for onychomycosis  [TextBox_37] : Dr. Mary Adrian last seen in 2020, due for f/u  [Guns at Home] : no guns at home [Driving Concerns] : not driving or driving without noted concerns [[facility]] : >12 sec  [FreeTextEntry8] : UI since hospitalization in April '21, and FI+ [de-identified] : dtr manages  [de-identified] : PHQ2 of 1 on 9/12/22 [AdvancecareDate] : 5/23/23 [FreeTextEntry4] : HCP form on file: veronica Ruiz is primary HCP, alternate is Sylvain\isac NEWMAN on file: DNR, DNI, limited med interventions, NFT, send to hospital, limitation of abx. Would not want aggressive measures at EOL.

## 2023-05-23 NOTE — SOCIAL HISTORY
[Alone] : lives alone [Apartment] : in an apartment [Female] : Female [FreeTextEntry1] : 2x12h [facility]  [FreeTextEntry4] : via Medicaid

## 2023-05-23 NOTE — REVIEW OF SYSTEMS
[Loss Of Hearing] : hearing loss [Incontinence] : incontinence [As Noted in HPI] : as noted in HPI [Negative] : Musculoskeletal [FreeTextEntry3] : wears glasses [FreeTextEntry7] : FI +

## 2023-05-23 NOTE — PHYSICAL EXAM
[EOMI] : extraocular movements were intact [Heart Rate And Rhythm] : heart rate was normal and rhythm regular [Normal Hearing] : hearing was not normal [Normal] : the pedal pulses are present, edema was not present [Cervical Lymph Nodes Enlarged Posterior Bilaterally] : posterior cervical [Supraclavicular Lymph Nodes Enlarged Bilaterally] : supraclavicular [Cervical Lymph Nodes Enlarged Anterior Bilaterally] : anterior cervical, supraclavicular [Normal Gait] : abnormal gait [Normal Insight/Judgment] : insight and judgment were not intact [de-identified] : L chest PPM [de-identified] : declines exam  [de-identified] : slow unsteady gait, needs assistance to hold on for balance, generalized weakness  [de-identified] : alert, unsteady gait, minimally verbal  [de-identified] : calm, cooperative , confused [Version 1] : Word list version 1 - Banana, Sunrise, Chair [Abnormal Clock Drawn or Refused to Draw Clock (0 points)] : abnormal clock drawn or refused to draw clock (0 points) [1 Word (1 point)] : 1 word (1 point) [1 Point] : 1 point

## 2023-06-09 ENCOUNTER — RX RENEWAL (OUTPATIENT)
Age: 88
End: 2023-06-09

## 2023-06-09 RX ORDER — MULTIVIT-MIN/FOLIC/VIT K/LYCOP 400-300MCG
25 MCG TABLET ORAL
Qty: 90 | Refills: 3 | Status: ACTIVE | COMMUNITY
Start: 2022-01-13 | End: 1900-01-01

## 2023-06-14 DIAGNOSIS — R82.90 UNSPECIFIED ABNORMAL FINDINGS IN URINE: ICD-10-CM

## 2023-06-26 ENCOUNTER — LABORATORY RESULT (OUTPATIENT)
Age: 88
End: 2023-06-26

## 2023-06-28 LAB
APPEARANCE: ABNORMAL
BILIRUBIN URINE: NEGATIVE
BLOOD URINE: ABNORMAL
COLOR: YELLOW
GLUCOSE QUALITATIVE U: NEGATIVE MG/DL
KETONES URINE: NEGATIVE MG/DL
LEUKOCYTE ESTERASE URINE: ABNORMAL
NITRITE URINE: NEGATIVE
PH URINE: 7
PROTEIN URINE: NEGATIVE MG/DL
SPECIFIC GRAVITY URINE: 1.01
UROBILINOGEN URINE: 0.2 MG/DL

## 2023-07-26 ENCOUNTER — APPOINTMENT (OUTPATIENT)
Dept: GERIATRICS | Facility: CLINIC | Age: 88
End: 2023-07-26

## 2023-08-28 ENCOUNTER — APPOINTMENT (OUTPATIENT)
Dept: GERIATRICS | Facility: CLINIC | Age: 88
End: 2023-08-28

## 2023-09-29 NOTE — ED ADULT TRIAGE NOTE - AS TEMP SITE
Patient notified of positive FIT test  She would like to see   Referral placed   Patient has OV on 10/3/23 with  oral

## 2023-10-16 ENCOUNTER — APPOINTMENT (OUTPATIENT)
Dept: GERIATRICS | Facility: CLINIC | Age: 88
End: 2023-10-16
Payer: MEDICARE

## 2023-10-16 VITALS
RESPIRATION RATE: 18 BRPM | HEART RATE: 100 BPM | BODY MASS INDEX: 21.57 KG/M2 | DIASTOLIC BLOOD PRESSURE: 107 MMHG | SYSTOLIC BLOOD PRESSURE: 178 MMHG | OXYGEN SATURATION: 97 % | WEIGHT: 117.25 LBS | HEIGHT: 62 IN

## 2023-10-16 DIAGNOSIS — Z23 ENCOUNTER FOR IMMUNIZATION: ICD-10-CM

## 2023-10-16 DIAGNOSIS — F39 UNSPECIFIED MOOD [AFFECTIVE] DISORDER: ICD-10-CM

## 2023-10-16 PROCEDURE — G0008: CPT | Mod: 59

## 2023-10-16 PROCEDURE — 90662 IIV NO PRSV INCREASED AG IM: CPT | Mod: 59

## 2023-10-16 PROCEDURE — 99214 OFFICE O/P EST MOD 30 MIN: CPT | Mod: 25

## 2023-10-20 LAB
ANION GAP SERPL CALC-SCNC: 20 MMOL/L
BASOPHILS # BLD AUTO: 0.07 K/UL
BASOPHILS NFR BLD AUTO: 0.8 %
BUN SERPL-MCNC: 16 MG/DL
CALCIUM SERPL-MCNC: 10.1 MG/DL
CHLORIDE SERPL-SCNC: 102 MMOL/L
CO2 SERPL-SCNC: 20 MMOL/L
CREAT SERPL-MCNC: 0.84 MG/DL
EGFR: 66 ML/MIN/1.73M2
EOSINOPHIL # BLD AUTO: 0.14 K/UL
EOSINOPHIL NFR BLD AUTO: 1.5 %
GLUCOSE SERPL-MCNC: 94 MG/DL
HCT VFR BLD CALC: 46.7 %
HGB BLD-MCNC: 14.9 G/DL
IMM GRANULOCYTES NFR BLD AUTO: 0.3 %
LYMPHOCYTES # BLD AUTO: 1.52 K/UL
LYMPHOCYTES NFR BLD AUTO: 16.4 %
MAN DIFF?: NORMAL
MCHC RBC-ENTMCNC: 29.9 PG
MCHC RBC-ENTMCNC: 31.9 GM/DL
MCV RBC AUTO: 93.8 FL
MONOCYTES # BLD AUTO: 1.02 K/UL
MONOCYTES NFR BLD AUTO: 11 %
NEUTROPHILS # BLD AUTO: 6.48 K/UL
NEUTROPHILS NFR BLD AUTO: 70 %
PLATELET # BLD AUTO: 323 K/UL
POTASSIUM SERPL-SCNC: 4.9 MMOL/L
RBC # BLD: 4.98 M/UL
RBC # FLD: 13.6 %
SODIUM SERPL-SCNC: 142 MMOL/L
WBC # FLD AUTO: 9.26 K/UL

## 2023-11-06 ENCOUNTER — APPOINTMENT (OUTPATIENT)
Dept: NEUROLOGY | Facility: CLINIC | Age: 88
End: 2023-11-06
Payer: MEDICARE

## 2023-11-06 PROCEDURE — 99214 OFFICE O/P EST MOD 30 MIN: CPT

## 2024-02-20 NOTE — ED PROVIDER NOTE - CROS ED ROS STATEMENT
[FreeTextEntry1] : patient co stress incontinence. check sonogram bladder \par  bp high. increase to toprol xl 100 bid\par  follow up colonosocopy\par  Labs drawn/ specimens obtained  in office on this date of service  for evaluation of   assessed conditions - pap     to be run at [facility].\par   Information regarding flu shot reviewed. All questions answered.Flu shot .5 cc IM      deltoid . No reaction noted. Advised patients to wash hands to reduce the spread of infection.\par   all other ROS negative except as per HPI

## 2024-04-11 ENCOUNTER — APPOINTMENT (OUTPATIENT)
Dept: GERIATRICS | Facility: CLINIC | Age: 89
End: 2024-04-11
Payer: MEDICARE

## 2024-04-11 PROCEDURE — 99442: CPT

## 2024-04-11 PROCEDURE — G2211 COMPLEX E/M VISIT ADD ON: CPT | Mod: NC,1L

## 2024-04-17 ENCOUNTER — RX RENEWAL (OUTPATIENT)
Age: 89
End: 2024-04-17

## 2024-04-23 ENCOUNTER — APPOINTMENT (OUTPATIENT)
Dept: NEUROLOGY | Facility: CLINIC | Age: 89
End: 2024-04-23
Payer: MEDICARE

## 2024-04-23 VITALS
HEART RATE: 105 BPM | HEIGHT: 62 IN | SYSTOLIC BLOOD PRESSURE: 167 MMHG | WEIGHT: 112 LBS | DIASTOLIC BLOOD PRESSURE: 100 MMHG | BODY MASS INDEX: 20.61 KG/M2

## 2024-04-23 PROCEDURE — 99214 OFFICE O/P EST MOD 30 MIN: CPT

## 2024-04-23 RX ORDER — CYCLOSPORINE 0.5 MG/ML
0.05 EMULSION OPHTHALMIC
Qty: 60 | Refills: 0 | Status: DISCONTINUED | COMMUNITY
Start: 2021-08-10 | End: 2024-04-23

## 2024-04-23 RX ORDER — SULFAMETHOXAZOLE AND TRIMETHOPRIM 800; 160 MG/1; MG/1
800-160 TABLET ORAL
Qty: 6 | Refills: 0 | Status: DISCONTINUED | COMMUNITY
Start: 2023-06-28 | End: 2024-04-23

## 2024-04-23 RX ORDER — LEVOCETIRIZINE DIHYDROCHLORIDE 5 MG/1
5 TABLET ORAL
Qty: 1 | Refills: 1 | Status: DISCONTINUED | COMMUNITY
Start: 2016-12-23 | End: 2024-04-23

## 2024-04-23 RX ORDER — L.ACIDOPH/L.BULG/B.BIF/S.THERM 1B-250 MG
TABLET ORAL
Qty: 20 | Refills: 1 | Status: DISCONTINUED | COMMUNITY
Start: 2023-06-28 | End: 2024-04-23

## 2024-04-23 RX ORDER — TRAZODONE HYDROCHLORIDE 50 MG/1
50 TABLET ORAL
Qty: 90 | Refills: 3 | Status: ACTIVE | COMMUNITY
Start: 2018-11-06 | End: 1900-01-01

## 2024-04-23 RX ORDER — DORZOLAMIDE HYDROCHLORIDE TIMOLOL MALEATE 20; 5 MG/ML; MG/ML
2-0.5 SOLUTION/ DROPS OPHTHALMIC
Qty: 10 | Refills: 0 | Status: DISCONTINUED | COMMUNITY
Start: 2020-09-21 | End: 2024-04-23

## 2024-04-23 RX ORDER — VITAMINS A AND D OINTMENT 15.5; 53.4 G/100G; G/100G
OINTMENT TOPICAL
Qty: 1 | Refills: 5 | Status: DISCONTINUED | COMMUNITY
Start: 2023-04-26 | End: 2024-04-23

## 2024-04-23 NOTE — ASSESSMENT
[FreeTextEntry1] : Assessment: 89yo RH HW, progressive cognitive decline, due to Alzheimer's disease. Moderate dementia now, but remails partially able to attend ADLs with supervision. Motor ok, just very cautioned, needs supervision for balance. Remains in very good mood and disposition, with good appetite and sleep pattern. Overall, very stable. No change in management.   Impression: -LOAD  Plan: -no changes in meds -continue with as much activity as possible.

## 2024-04-23 NOTE — PHYSICAL EXAM
[General Appearance - Alert] : alert [General Appearance - In No Acute Distress] : in no acute distress [Oriented To Time, Place, And Person] : oriented to person, place, and time [Impaired Insight] : insight and judgment were intact [Affect] : the affect was normal [Person] : oriented to person [Registration Intact] : recent registration memory intact [Concentration Intact] : normal concentrating ability [Visual Intact] : visual attention was ~T not ~L decreased [Naming Objects] : no difficulty naming common objects [Fluency] : fluency intact [Comprehension] : comprehension intact [Past History] : adequate knowledge of personal past history [Vocabulary] : adequate range of vocabulary [Cranial Nerves Optic (II)] : visual acuity intact bilaterally,  visual fields full to confrontation, pupils equal round and reactive to light [Cranial Nerves Oculomotor (III)] : extraocular motion intact [Cranial Nerves Trigeminal (V)] : facial sensation intact symmetrically [Cranial Nerves Facial (VII)] : face symmetrical [Cranial Nerves Vestibulocochlear (VIII)] : hearing was intact bilaterally [Cranial Nerves Glossopharyngeal (IX)] : tongue and palate midline [Cranial Nerves Accessory (XI - Cranial And Spinal)] : head turning and shoulder shrug symmetric [Cranial Nerves Hypoglossal (XII)] : there was no tongue deviation with protrusion [Motor Strength] : muscle strength was normal in all four extremities [Involuntary Movements] : no involuntary movements were seen [No Muscle Atrophy] : normal bulk in all four extremities [Motor Handedness Right-Handed] : the patient is right hand dominant [Sensation Tactile Decrease] : light touch was intact [Sensation Pain / Temperature Decrease] : pain and temperature was intact [Sensation Vibration Decrease] : vibration was intact [Proprioception] : proprioception was intact [Balance] : balance was intact [2+] : Ankle jerk left 2+ [Sclera] : the sclera and conjunctiva were normal [PERRL With Normal Accommodation] : pupils were equal in size, round, reactive to light, with normal accommodation [Extraocular Movements] : extraocular movements were intact [No APD] : no afferent pupillary defect [No JOHNNY] : no internuclear ophthalmoplegia [Full Visual Field] : full visual field [Outer Ear] : the ears and nose were normal in appearance [Neck Appearance] : the appearance of the neck was normal [Edema] : there was no peripheral edema [Abdomen Mass (___ Cm)] : no abdominal mass palpated [Abnormal Walk] : normal gait [] : no rash [FreeTextEntry1] : [id number]: Exam stable. Pt still very agile and aware. [Place] : disoriented to place [Time] : disoriented to time [Short Term Intact] : short term memory impaired [Remote Intact] : remote memory impaired [Span Intact] : the attention span was decreased [Repeating Phrases] : difficulty repeating a phrase [Writing A Sentence] : difficulty writing a sentence [Reading] : difficulty reading [Current Events] : inadequate knowledge of current events [Motor Strength Upper Extremities Bilaterally] : strength was normal in both upper extremities [Motor Strength Lower Extremities Bilaterally] : strength was normal in both lower extremities [Romberg's Sign] : Romberg's sign was negtive [Allodynia] : no ~T allodynia present [Dysesthesia] : no dysesthesia [Hyperesthesia] : no hyperesthesia [Limited Balance] : balance was intact [Past-pointing] : there was no past-pointing [Tremor] : no tremor present [Dysdiadochokinesia Bilaterally] : not present [Coordination - Dysmetria Impaired Finger-to-Nose Bilateral] : not present [Coordination - Dysmetria Impaired Heel-to-Shin Bilateral] : not present [Plantar Reflex Right Only] : normal on the right [Plantar Reflex Left Only] : normal on the left [FreeTextEntry6] : Mild increased tone in LUE. [FreeTextEntry8] : short steps

## 2024-04-23 NOTE — HISTORY OF PRESENT ILLNESS
[FreeTextEntry1] : NO COVID. COVID VACCINE FULL.  HPI-Interval Hx [id number]: Since last seen: -took a cough syrup and got slurred speech, stopped and is ok now - AE from med -appetite ok, needs minced food -sleep mostly ok -motor stable -seldom agitation, mostly when redirected -rest is stable.  HPI-Interval Hx [id number]: pt here with HHA. Since last seen: -no change in meds -appetite and sleep are ok -motor stable, no falls. Engages with people, is often in a good mood. ADL: needs help with dressing and washing but remains quite sharp. CFS 5. Rest is stable.  HPI-Interval Hx 20230206: since last seen, Trazodone resumed, given change in behavior. Eats well, but uses blended food. Has gained a bit of weight. Wears a diaper now. ADL reduced, can eat by herself, but for the rest she needs to be followed. IADL poor. Sleep ok. No matt agitation, rare tantrums. Sings a lot.  Motor ok, slow and cautioned, but overall moves well.    HPI-Interval Hx 20220613: Pt last seen in 6/2021. Risperidone dc. Trazodone now 25mg BID (?). Health wise, she is stable. ADL limited, uses diapers and needs to be fed.  Since dc Risperidone she is more mobile and active.  Mood seems stable, no much agitation, only upset at times when she refuses to eat, but overall ok. Remembers people who are around here, but that's the extent of her memory.   HPI-Interval Hx [id number]: pt had a few falls in April, one with broken nose. Also got PPM put in in April.  Likely bradycardia with heart block. Also started on Risperidone, now 1mg BID. Appetite ok, but she has lost weight. Sleep: takes a long time to fall asleep. More restless, per daughter there is some shuffle.   HPI-Interval Hx [id number]: pt here with daughter for follow-up Over the last few months, she has declined significantly She has mis-recognition of her own home and often asks to leave; she was found wandering outside, and was brought back by passers-by in a few occasions She confuses night and day, but overall sleeps well Appetite is stable, she needs to be reminded to eat, no change in weight STM is poorer as well Communication is ok, speech preserved. Motor: slower, more careful with steps; one fall a month ago, tripped; more sedentary lately, does not like to move much She sometimes does not recognize familiar people around her. Psych: tends to get upset easily, but no overt agitation, nor productive symptoms.   ADL: limited to eat, dressing and washing IADL: poor; can use the TV. CDR: 2.0.   HPI-Interval Hx 20200921: NO COVID.  Since last seen, pt has progressed, STM is worse, she forgets she eats and wants to eat again. She has not tolerated Rivastigmine, gave her a rash. Started on Donepezil 10mg again, no AE. She was prescribed Risperidone (?mg), for agitation, never started. In one occasion she was upset with the aid. Delusions to have seen dead people, unclear if actual hallucinations.  Sleep: stable. Appetite: wants fresh food, and likes to eat. Motor: intact. ADl: needs prompting for washing; recent UTI with some incontinence and using wrong place to urinate IADL: limited; cannot manage medications; cannot cook.  CDR: 1-1.5.   HPI-Interval Hx [id number]: Had cataract sx in 2/2019, but does not recall that. She feels well, and has no conscience of being forgetful. At times, she has forgot name of grandson. Home attendant changed, with better social interaction now. Sleep: she tends to wake up and start doing things around the house. No daytime naps. Appetite: intact, gained 2lb.  Motor: ok, no issues.  Per wife, pt likes to drink wine, may go out and get wine for herself from time to time. She is mostly with the HHA, but spends a few hours a day alone, and in one occasion she has roamed out, was supposed to get her phone fixed but never got to the shop.  She has minor itch from the patch, managed with anti-H cream.  HPI-Interval Hx [id number]: Pt doing fine. She was started on Trazodone 50mg. They have not looked into the trials at [facility], daughter works and pt does not want to. No AE from meds.  Eats and sleeps well, bug she might forget to eat her meals at times.  Per daughter, she has got lost in the last few months a few times, walking in the neighborhood.   HPI-Interval Hx [id number]: Did not comply with Aricept, now on Rivastigmine Patch 13.3mg.  Overall doing well, no AE. Eating well. Sleeps overall well, at times she wakes up at random times.  MRI brain reviewed with daughter, showing mixed pathology, mild vascular, and some atrophy in bilateral temporal lobes.   HPI-Interval Hx [id number]: MRI, labs and US carotids reviewed. Significant atrophy of FTP lobes.  Still very active, partly lives with her daughter a few days a week. Rest is stable.  PMH: 82yo RH HW, HTN, HLD, glaucoma, here for evaluation of dementia.  Family and PMD have noticed forgetfulness for c.a. 2y.  Unclear how it started, pt says she is fine, daughter does not live with her. Currently, she appears to be disoriented at times, she has issues with organization skills, she forgets dates, at times names.  ADl: intact. IADL: needs help for shiela etc. Does not drive. Sleep: takes Trazodone, at times difficulty falling asleep. Mood: no issues. No anxiety. Former jose.

## 2024-05-28 ENCOUNTER — APPOINTMENT (OUTPATIENT)
Dept: GERIATRICS | Facility: CLINIC | Age: 89
End: 2024-05-28
Payer: MEDICARE

## 2024-05-28 VITALS
HEART RATE: 82 BPM | SYSTOLIC BLOOD PRESSURE: 176 MMHG | DIASTOLIC BLOOD PRESSURE: 95 MMHG | OXYGEN SATURATION: 98 % | BODY MASS INDEX: 20.63 KG/M2 | WEIGHT: 112.13 LBS | HEIGHT: 62 IN

## 2024-05-28 DIAGNOSIS — N30.00 ACUTE CYSTITIS W/OUT HEMATURIA: ICD-10-CM

## 2024-05-28 DIAGNOSIS — E78.5 HYPERLIPIDEMIA, UNSPECIFIED: ICD-10-CM

## 2024-05-28 DIAGNOSIS — F41.9 ANXIETY DISORDER, UNSPECIFIED: ICD-10-CM

## 2024-05-28 DIAGNOSIS — Z13.29 ENCOUNTER FOR SCREENING FOR OTHER SUSPECTED ENDOCRINE DISORDER: ICD-10-CM

## 2024-05-28 DIAGNOSIS — G30.9 ALZHEIMER'S DISEASE, UNSPECIFIED: ICD-10-CM

## 2024-05-28 DIAGNOSIS — F51.04 PSYCHOPHYSIOLOGIC INSOMNIA: ICD-10-CM

## 2024-05-28 DIAGNOSIS — I10 ESSENTIAL (PRIMARY) HYPERTENSION: ICD-10-CM

## 2024-05-28 DIAGNOSIS — R39.81 FUNCTIONAL URINARY INCONTINENCE: ICD-10-CM

## 2024-05-28 DIAGNOSIS — H91.90 UNSPECIFIED HEARING LOSS, UNSPECIFIED EAR: ICD-10-CM

## 2024-05-28 DIAGNOSIS — R29.898 OTHER SYMPTOMS AND SIGNS INVOLVING THE MUSCULOSKELETAL SYSTEM: ICD-10-CM

## 2024-05-28 DIAGNOSIS — Z74.09 OTHER REDUCED MOBILITY: ICD-10-CM

## 2024-05-28 DIAGNOSIS — F32.A ANXIETY DISORDER, UNSPECIFIED: ICD-10-CM

## 2024-05-28 DIAGNOSIS — E67.3 HYPERVITAMINOSIS D: ICD-10-CM

## 2024-05-28 DIAGNOSIS — F02.818 ALZHEIMER'S DISEASE, UNSPECIFIED: ICD-10-CM

## 2024-05-28 DIAGNOSIS — G81.94 HEMIPLEGIA, UNSPECIFIED AFFECTING LEFT NONDOMINANT SIDE: ICD-10-CM

## 2024-05-28 DIAGNOSIS — R73.9 HYPERGLYCEMIA, UNSPECIFIED: ICD-10-CM

## 2024-05-28 DIAGNOSIS — Z78.9 OTHER REDUCED MOBILITY: ICD-10-CM

## 2024-05-28 PROCEDURE — 99214 OFFICE O/P EST MOD 30 MIN: CPT

## 2024-05-28 RX ORDER — ENALAPRIL MALEATE 10 MG/1
10 TABLET ORAL TWICE DAILY
Qty: 180 | Refills: 3 | Status: ACTIVE | COMMUNITY
Start: 2022-11-08 | End: 1900-01-01

## 2024-05-28 NOTE — ASSESSMENT
[FreeTextEntry1] :  ------------------------------------------------------------------------------------------------------------------------------------------- Scripts for incontinence supplies provided today  BP high today - Adv to keep BP log and bring to next visit along with BP machine - After discussion we agree to inc Enalapril to 10mg BID - monitor BP and will consider further adjustment of dose at next visit if needed - Dtr to request visit note from recent cards visit - including EKG and VS?   ENT/audiology referral given previously - adv to consider f/u w/ audiology for HAs if would cooperate with HAs  Ophtho f/u pending - f/u eval scheduled for this summer per dtr and to request visit note for review  BPSD now manageable and sleeping well - c/w behavioral interventions - c/w Trazodone 50mg QHS  Wt recently stable  - Cont to encourage/assist PO intake - c/w comfort feeds when pt cooperative - Aspiration precautions  - c/w 24/7 supervision and assistance w/ ADLs Fall precautions  d/w dtr Sara via telephone during visit today   Lab work ordered - f/u results  f/u in 1 mo , unless earlier PRN

## 2024-05-28 NOTE — HISTORY OF PRESENT ILLNESS
[Patient reported vision is normal] : Patient reported vision is normal [Patient denied dental screening.] : Patient denied dental screening [Patient declined colon rectal/cancer screening] : Patient declined colon/rectal cancer screening [Patient declined skin cancer screening] : Patient declined skin cancer screening [Patient declined breast sonogram] : Patient declined breast sonogram [Patient declined cervical cancer screening] : Patient declined cervical cancer screening [No falls in past year] : Patient reported no falls in the past year [Completely Dependent] : Completely dependent. [Full assistance needed] : Assistance needed managing medications [FAST Score: ____] : Functional Assessment Scale (FAST) Score: [unfilled] [Smoke Detector] : smoke detector [Carbon Monoxide Detector] : carbon monoxide detector [Wears Seat Belt] : wears seat belt [Other reason not done] : Other reason not done [Severe] : Stage: Severe [Stable] : Status: Stable [Memory Lapses Or Loss] : stable memory impairment [Patient Observed To Be Agitated] : denies agitation [Hostility Toward Caregivers] : denies aggression [Sleep Disturbances] : denies sleep disturbances [] : denies wandering [Fixed Beliefs Contradicted By Reality (Delusions)] : denies delusions [Difficulty Finding Desired Words] : stable difficulty finding desired words [Designated Healthcare Proxy] : Designated healthcare proxy [DNR] : DNR [DNI] : DNI [FreeTextEntry1] : No acute events since last visit including falls, hospitalizations, ED visits, urgent care visits.  TODAY pt denies complaints however limited historian d/t baseline confusion/memory loss.   Requests scripts for medium Prevails Diapers, medium gloves, bed pads.   BP machine/log at home - forgot to bring.  State BP ranges 130s-170s/60s at home. Labile.   Seen by cards since our last visit including for PPM check.   Has appt with Optho for f/u this summer.   Lives with 24/7 HHA.  Daughter lives in [place] but currently traveling in [place].   MOBILITY: Ambulates with assistance. Uses WC for longer distances. LUE immobile s/p fall in hospital per dtr.    Deny new urinary complaints.  TOILETING: chronic UI  BPS: behaviors manageable, mood generally good  Sleeping well with Trazodone QHS.  - Previously on Risperidone - tapered off d/t improvement in sleep after hospitalization in 8/21  Appetite: good, weight recently stable   DEMENTIA / h/o SDH - 6/22: Pt doing better overall. Off hospice now. Eating better and gained 10 lbs since last visit. Eats pureed diet.  - 9/21: Hospitalized in 8/21for SDH s/p fall. Repeat CTH unchanged, baseline mental status. Family and HCP does not want neurosurgical interventions. R shoulder pain after fall, no fractures on x ray but patient unable to lift arm. Xray's negative. CT scan demonstrating no acute bony disruption, likely subacromial bursitis. Patient s/p recent shoulder arthroscopy, unknown procedure performed at OSH. Advised outpatient follow-up with primary surgeon upon discharge. No further w/u required this admission. Can wear sling for comfort as needed, but would advise passive ROM exercises daily. R elbow pain after fall, full ROM, no point tenderness, no ecchymosis. xray negative for fracture.  - MOCA 0/30 on 8/23/21 and MMSE 5/30 in Aug '21 with    - MRI brain 2017: not impressive   [x] Neuro Dr. Barry - imp: LOAD   - Previously on exelon - stopped d/t rash  - Previously on Aricept - stopped d/t GOC is to focus on comfort measures and do not wish to prolong life in current state   HTN / CHB s/p PPM in May '21 - TTE may '21: LVEF 45% - follows w/ cards Dr. Monte, and Dr. Connor Royal for EP/PPM checks  [Patient declined hearing screen] : Patient declined hearing screen [TextBox_25] : takes vit D supplement  [TextBox_31] : was rec hearing aids, pending f/u with audiologist. Dtr states hearing is fine.  [BoneDensityDate] : 2020 [TextBox_37] : Dr. Mary Adrian last seen in 2020. Has appt with Optho for f/u this summer per dtr Sara    [FreeTextEntry7] : follows w/ podiatrist for onychomycosis  [Guns at Home] : no guns at home [Driving Concerns] : not driving or driving without noted concerns [[facility]] : >12 sec  [FreeTextEntry8] : UI since hospitalization in April '21, and FI+ [de-identified] : dtr manages  [de-identified] : PHQ2 of 1 on 9/12/22 [AdvancecareDate] : 5/23/23 [FreeTextEntry4] : HCP form on file: veronica Ruiz is primary HCP, alternate is Sylvain NEWMAN on file: DNR, DNI, limited med interventions, NFT, send to hospital, limitation of abx. Would not want aggressive measures at EOL

## 2024-05-28 NOTE — REASON FOR VISIT
[Follow-Up] : a follow-up visit [Formal Caregiver] : formal caregiver [Family Member] : family member [FreeTextEntry1] : HTN, Dementia  [FreeTextEntry3] : JOSE Guerrero, dtannamarie Ruiz via telephone  [FreeTextEntry2] : who assist with history d/t pt confused at baseline

## 2024-05-28 NOTE — PHYSICAL EXAM
[EOMI] : extraocular movements were intact [Heart Rate And Rhythm] : heart rate was normal and rhythm regular [Cervical Lymph Nodes Enlarged Posterior Bilaterally] : posterior cervical [Supraclavicular Lymph Nodes Enlarged Bilaterally] : supraclavicular [Cervical Lymph Nodes Enlarged Anterior Bilaterally] : anterior cervical, supraclavicular [Normal] : normal skin color and pigmentation [Normal Hearing] : hearing was not normal [Normal Gait] : abnormal gait [Normal Insight/Judgment] : insight and judgment were not intact [de-identified] : Lt chest PPM [de-identified] : declines exam  [de-identified] : slow unsteady gait, needs assistance to hold on for balance, generalized weakness, LUE hemiplegia   [de-identified] : alert, unsteady gait, minimally verbal, LUE hemiplegia   [de-identified] : calm, confused

## 2024-05-28 NOTE — SOCIAL HISTORY
[Alone] : lives alone [Apartment] : in an apartment [Female] : Female [FreeTextEntry1] : 2x12h [facility]  [FreeTextEntry4] : via Medicaid

## 2024-05-31 LAB
ALBUMIN SERPL ELPH-MCNC: 4.1 G/DL
ALP BLD-CCNC: 71 U/L
ALT SERPL-CCNC: 10 U/L
ANION GAP SERPL CALC-SCNC: 14 MMOL/L
AST SERPL-CCNC: 16 U/L
BASOPHILS # BLD AUTO: 0.06 K/UL
BASOPHILS NFR BLD AUTO: 0.7 %
BILIRUB SERPL-MCNC: 0.2 MG/DL
BUN SERPL-MCNC: 15 MG/DL
CALCIUM SERPL-MCNC: 9.4 MG/DL
CHLORIDE SERPL-SCNC: 103 MMOL/L
CHOLEST SERPL-MCNC: 214 MG/DL
CO2 SERPL-SCNC: 21 MMOL/L
CREAT SERPL-MCNC: 0.87 MG/DL
EGFR: 63 ML/MIN/1.73M2
EOSINOPHIL # BLD AUTO: 0.15 K/UL
EOSINOPHIL NFR BLD AUTO: 1.6 %
GLUCOSE SERPL-MCNC: 92 MG/DL
HCT VFR BLD CALC: 44.3 %
HDLC SERPL-MCNC: 73 MG/DL
HGB BLD-MCNC: 14.1 G/DL
IMM GRANULOCYTES NFR BLD AUTO: 0.2 %
LDLC SERPL CALC-MCNC: 118 MG/DL
LYMPHOCYTES # BLD AUTO: 1.45 K/UL
LYMPHOCYTES NFR BLD AUTO: 15.8 %
MAN DIFF?: NORMAL
MCHC RBC-ENTMCNC: 29.8 PG
MCHC RBC-ENTMCNC: 31.8 GM/DL
MCV RBC AUTO: 93.7 FL
MONOCYTES # BLD AUTO: 0.92 K/UL
MONOCYTES NFR BLD AUTO: 10 %
NEUTROPHILS # BLD AUTO: 6.56 K/UL
NEUTROPHILS NFR BLD AUTO: 71.7 %
NONHDLC SERPL-MCNC: 141 MG/DL
PLATELET # BLD AUTO: 216 K/UL
POTASSIUM SERPL-SCNC: 4.6 MMOL/L
PROT SERPL-MCNC: 7.1 G/DL
RBC # BLD: 4.73 M/UL
RBC # FLD: 13.2 %
SODIUM SERPL-SCNC: 139 MMOL/L
TRIGL SERPL-MCNC: 131 MG/DL
TSH SERPL-ACNC: 2.12 UIU/ML
WBC # FLD AUTO: 9.16 K/UL

## 2024-07-22 ENCOUNTER — APPOINTMENT (OUTPATIENT)
Dept: GERIATRICS | Facility: CLINIC | Age: 89
End: 2024-07-22
Payer: MEDICARE

## 2024-07-22 VITALS
WEIGHT: 107.38 LBS | HEIGHT: 62 IN | DIASTOLIC BLOOD PRESSURE: 101 MMHG | BODY MASS INDEX: 19.76 KG/M2 | SYSTOLIC BLOOD PRESSURE: 177 MMHG

## 2024-07-22 DIAGNOSIS — Z78.9 OTHER REDUCED MOBILITY: ICD-10-CM

## 2024-07-22 DIAGNOSIS — F02.818 ALZHEIMER'S DISEASE, UNSPECIFIED: ICD-10-CM

## 2024-07-22 DIAGNOSIS — H91.90 UNSPECIFIED HEARING LOSS, UNSPECIFIED EAR: ICD-10-CM

## 2024-07-22 DIAGNOSIS — F39 UNSPECIFIED MOOD [AFFECTIVE] DISORDER: ICD-10-CM

## 2024-07-22 DIAGNOSIS — Z74.09 OTHER REDUCED MOBILITY: ICD-10-CM

## 2024-07-22 DIAGNOSIS — I10 ESSENTIAL (PRIMARY) HYPERTENSION: ICD-10-CM

## 2024-07-22 DIAGNOSIS — H54.7 UNSPECIFIED VISUAL LOSS: ICD-10-CM

## 2024-07-22 DIAGNOSIS — G30.9 ALZHEIMER'S DISEASE, UNSPECIFIED: ICD-10-CM

## 2024-07-22 PROCEDURE — 99214 OFFICE O/P EST MOD 30 MIN: CPT

## 2024-07-22 PROCEDURE — G2211 COMPLEX E/M VISIT ADD ON: CPT

## 2024-07-22 NOTE — SOCIAL HISTORY
[Alone] : lives alone [Apartment] : in an apartment [Female] : Female [FreeTextEntry1] : 2x12h [facility]  [FreeTextEntry4] : via Medicaid

## 2024-07-22 NOTE — HISTORY OF PRESENT ILLNESS
[Patient declined hearing screen] : Patient declined hearing screen [Patient reported vision is normal] : Patient reported vision is normal [Patient denied dental screening.] : Patient denied dental screening [Patient declined colon rectal/cancer screening] : Patient declined colon/rectal cancer screening [Patient declined skin cancer screening] : Patient declined skin cancer screening [Patient declined breast sonogram] : Patient declined breast sonogram [Patient declined cervical cancer screening] : Patient declined cervical cancer screening [No falls in past year] : Patient reported no falls in the past year [Completely Dependent] : Completely dependent. [Full assistance needed] : Assistance needed managing medications [FAST Score: ____] : Functional Assessment Scale (FAST) Score: [unfilled] [Smoke Detector] : smoke detector [Carbon Monoxide Detector] : carbon monoxide detector [Wears Seat Belt] : wears seat belt [Other reason not done] : Other reason not done [Severe] : Stage: Severe [Stable] : Status: Stable [Memory Lapses Or Loss] : stable memory impairment [Patient Observed To Be Agitated] : denies agitation [Hostility Toward Caregivers] : denies aggression [Sleep Disturbances] : denies sleep disturbances [] : denies wandering [Fixed Beliefs Contradicted By Reality (Delusions)] : denies delusions [Difficulty Finding Desired Words] : stable difficulty finding desired words [Designated Healthcare Proxy] : Designated healthcare proxy [DNR] : DNR [DNI] : DNI [FreeTextEntry1] : No acute events since last visit including falls, hospitalizations, ED visits, urgent care visits.  TODAY pt denies complaints however limited historian d/t baseline confusion/memory loss.   BP machine/log at home reviewed - labile BP from 110s - 170s.  Cardiology records pending.  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 Monte, and Dr. Connor Royal for EP/PPM checks   Lives with 24/7 HHA.  Daughter Sara lives in [place].   MOBILITY: Ambulates with assistance. Uses WC for longer distances. LUE immobile s/p fall in hospital per dtr.    Deny new urinary complaints.  TOILETING: chronic UI  BPS: behaviors manageable, mood generally good  Sleeping well with Trazodone QHS.  - Previously on Risperidone - tapered off d/t improvement in sleep after hospitalization in 8/21  Appetite: drinks fluids well, decrease food intake, 5lb weight loss since last visit in 5/2024  DEMENTIA / h/o SDH - 6/22: Pt doing better overall. Off hospice now. Eating better and gained 10 lbs since last visit. Eats pureed diet.  - 9/21: Hospitalized in 8/21for SDH s/p fall. Repeat CTH unchanged, baseline mental status. Family and HCP does not want neurosurgical interventions. R shoulder pain after fall, no fractures on x ray but patient unable to lift arm. Xray's negative. CT scan demonstrating no acute bony disruption, likely subacromial bursitis. Patient s/p recent shoulder arthroscopy, unknown procedure performed at OSH. Advised outpatient follow-up with primary surgeon upon discharge. No further w/u required this admission. Can wear sling for comfort as needed, but would advise passive ROM exercises daily. R elbow pain after fall, full ROM, no point tenderness, no ecchymosis. xray negative for fracture.  - MOCA 0/30 on 8/23/21 and MMSE 5/30 in Aug '21 with    - MRI brain 2017: not impressive   - Neuro Dr. Barry - imp: LOAD   - Previously on exelon - stopped d/t rash  - Previously on Aricept - stopped d/t GOC is to focus on comfort measures and do not wish to prolong life in current state   [TextBox_25] : takes vit D supplement  [TextBox_31] : was rec hearing aids, pending f/u with audiologist. Dtr states hearing is fine.  [BoneDensityDate] : 2020 [TextBox_37] : Dr. Mary Adrian. Appt for f/u is this summer   [FreeTextEntry7] : follows w/ podiatrist for onychomycosis  [Driving Concerns] : not driving or driving without noted concerns [[facility]] : >12 sec  [FreeTextEntry8] : UI since hospitalization in April '21, and FI+ [de-identified] : dtr manages  [de-identified] : PHQ2 of 1 on 9/12/22 [AdvancecareDate] : 5/23/23 [FreeTextEntry4] : HCP form on file: veronica Ruiz is primary HCP, alternate is Sylvain NEWMAN on file: DNR, DNI, limited med interventions, NFT, send to hospital, limitation of abx. Would not want aggressive measures at EOL

## 2024-07-22 NOTE — PHYSICAL EXAM
[EOMI] : extraocular movements were intact [Heart Rate And Rhythm] : heart rate was normal and rhythm regular [Cervical Lymph Nodes Enlarged Posterior Bilaterally] : posterior cervical [Supraclavicular Lymph Nodes Enlarged Bilaterally] : supraclavicular [Cervical Lymph Nodes Enlarged Anterior Bilaterally] : anterior cervical, supraclavicular [Normal] : normal skin color and pigmentation [Normal Hearing] : hearing was not normal [Normal Gait] : abnormal gait [Normal Insight/Judgment] : insight and judgment were not intact [de-identified] : Lt chest PPM [de-identified] : declines exam  [de-identified] : slow unsteady gait, needs assistance to hold on for balance, generalized weakness, LUE hemiplegia   [de-identified] : alert, unsteady gait, minimally verbal, LUE hemiplegia   [de-identified] : calm, confused

## 2024-07-22 NOTE — PHYSICAL EXAM
[EOMI] : extraocular movements were intact [Heart Rate And Rhythm] : heart rate was normal and rhythm regular [Cervical Lymph Nodes Enlarged Posterior Bilaterally] : posterior cervical [Supraclavicular Lymph Nodes Enlarged Bilaterally] : supraclavicular [Cervical Lymph Nodes Enlarged Anterior Bilaterally] : anterior cervical, supraclavicular [Normal] : normal skin color and pigmentation [Normal Hearing] : hearing was not normal [Normal Gait] : abnormal gait [Normal Insight/Judgment] : insight and judgment were not intact [de-identified] : Lt chest PPM [de-identified] : declines exam  [de-identified] : slow unsteady gait, needs assistance to hold on for balance, generalized weakness, LUE hemiplegia   [de-identified] : alert, unsteady gait, minimally verbal, LUE hemiplegia   [de-identified] : calm, confused

## 2024-07-22 NOTE — REASON FOR VISIT
[Follow-Up] : a follow-up visit [Formal Caregiver] : formal caregiver [FreeTextEntry1] : HTN, Dementia  [FreeTextEntry3] : JOSE  [FreeTextEntry2] : who assist with history d/t pt confused at baseline

## 2024-07-22 NOTE — ASSESSMENT
[FreeTextEntry1] :  -------------------------------------------------------------------------------------------------------------------------------------------  BP elevated today, with some improvement on repeat to 160s/90s, in setting of agitation - pt uncooperative with VS check today - BP log reviewed - labile BP even at home - unclear if readings accurate vs pt agitated by BP checking - Attempted to contact dtr Sara to discuss plan - pending callback - For now, c/w Enalapril 10mg BID - Consider monitor BP when patient cooperative/calm, otherwise would focus on behavioral interventions for behavior management  - To f/u on request for visit notes from recent cards visit - including last EKG and VS/plan    ENT/audiology referral given previously - adv to consider f/u w/ audiology for HAs if would cooperate with HAs  Ophtho f/u pending - f/u eval scheduled for this summer per dtr and to request visit note for review  - sleep hygiene/routine - c/w Trazodone 50mg QHS  Wt trending down - Cont to encourage/assist PO intake - c/w comfort feeds when pt cooperative - Aspiration precautions  - c/w 24/7 supervision and assistance w/ ADLs Fall precautions  LVM for dtr Sara with callback info to discuss  f/u in 1 mo , unless earlier PRN

## 2024-07-22 NOTE — HISTORY OF PRESENT ILLNESS
[Patient declined hearing screen] : Patient declined hearing screen [Patient reported vision is normal] : Patient reported vision is normal [Patient denied dental screening.] : Patient denied dental screening [Patient declined colon rectal/cancer screening] : Patient declined colon/rectal cancer screening [Patient declined skin cancer screening] : Patient declined skin cancer screening [Patient declined breast sonogram] : Patient declined breast sonogram [Patient declined cervical cancer screening] : Patient declined cervical cancer screening [No falls in past year] : Patient reported no falls in the past year [Completely Dependent] : Completely dependent. [Full assistance needed] : Assistance needed managing medications [FAST Score: ____] : Functional Assessment Scale (FAST) Score: [unfilled] [Smoke Detector] : smoke detector [Carbon Monoxide Detector] : carbon monoxide detector [Wears Seat Belt] : wears seat belt [Other reason not done] : Other reason not done [Severe] : Stage: Severe [Stable] : Status: Stable [Memory Lapses Or Loss] : stable memory impairment [Patient Observed To Be Agitated] : denies agitation [Hostility Toward Caregivers] : denies aggression [Sleep Disturbances] : denies sleep disturbances [] : denies wandering [Fixed Beliefs Contradicted By Reality (Delusions)] : denies delusions [Difficulty Finding Desired Words] : stable difficulty finding desired words [Designated Healthcare Proxy] : Designated healthcare proxy [DNR] : DNR [DNI] : DNI [FreeTextEntry1] : No acute events since last visit including falls, hospitalizations, ED visits, urgent care visits.  TODAY pt denies complaints however limited historian d/t baseline confusion/memory loss.   BP machine/log at home reviewed - labile BP from 110s - 170s.  Cardiology records pending.  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 Monte, and Dr. Connor Royal for EP/PPM checks   Lives with 24/7 HHA.  Daughter Sara lives in [place].   MOBILITY: Ambulates with assistance. Uses WC for longer distances. LUE immobile s/p fall in hospital per dtr.    Deny new urinary complaints.  TOILETING: chronic UI  BPS: behaviors manageable, mood generally good  Sleeping well with Trazodone QHS.  - Previously on Risperidone - tapered off d/t improvement in sleep after hospitalization in 8/21  Appetite: drinks fluids well, decrease food intake, 5lb weight loss since last visit in 5/2024  DEMENTIA / h/o SDH - 6/22: Pt doing better overall. Off hospice now. Eating better and gained 10 lbs since last visit. Eats pureed diet.  - 9/21: Hospitalized in 8/21for SDH s/p fall. Repeat CTH unchanged, baseline mental status. Family and HCP does not want neurosurgical interventions. R shoulder pain after fall, no fractures on x ray but patient unable to lift arm. Xray's negative. CT scan demonstrating no acute bony disruption, likely subacromial bursitis. Patient s/p recent shoulder arthroscopy, unknown procedure performed at OSH. Advised outpatient follow-up with primary surgeon upon discharge. No further w/u required this admission. Can wear sling for comfort as needed, but would advise passive ROM exercises daily. R elbow pain after fall, full ROM, no point tenderness, no ecchymosis. xray negative for fracture.  - MOCA 0/30 on 8/23/21 and MMSE 5/30 in Aug '21 with    - MRI brain 2017: not impressive   - Neuro Dr. Barry - imp: LOAD   - Previously on exelon - stopped d/t rash  - Previously on Aricept - stopped d/t GOC is to focus on comfort measures and do not wish to prolong life in current state   [TextBox_25] : takes vit D supplement  [TextBox_31] : was rec hearing aids, pending f/u with audiologist. Dtr states hearing is fine.  [BoneDensityDate] : 2020 [TextBox_37] : Dr. Mary Adrian. Appt for f/u is this summer   [FreeTextEntry7] : follows w/ podiatrist for onychomycosis  [Driving Concerns] : not driving or driving without noted concerns [[facility]] : >12 sec  [FreeTextEntry8] : UI since hospitalization in April '21, and FI+ [de-identified] : dtr manages  [de-identified] : PHQ2 of 1 on 9/12/22 [AdvancecareDate] : 5/23/23 [FreeTextEntry4] : HCP form on file: veronica Ruiz is primary HCP, alternate is Sylvain NEWMAN on file: DNR, DNI, limited med interventions, NFT, send to hospital, limitation of abx. Would not want aggressive measures at EOL

## 2024-07-22 NOTE — SOCIAL HISTORY
[Alone] : lives alone [Apartment] : in an apartment [Female] : Female [FreeTextEntry1] : 2x12h [facility]  [FreeTextEntry4] : via Medicaid

## 2024-10-29 ENCOUNTER — NON-APPOINTMENT (OUTPATIENT)
Age: 89
End: 2024-10-29

## 2024-11-01 ENCOUNTER — APPOINTMENT (OUTPATIENT)
Dept: RADIOLOGY | Facility: IMAGING CENTER | Age: 89
End: 2024-11-01
Payer: MEDICARE

## 2024-11-01 ENCOUNTER — RESULT REVIEW (OUTPATIENT)
Age: 89
End: 2024-11-01

## 2024-11-01 ENCOUNTER — OUTPATIENT (OUTPATIENT)
Dept: OUTPATIENT SERVICES | Facility: HOSPITAL | Age: 89
LOS: 1 days | End: 2024-11-01
Payer: COMMERCIAL

## 2024-11-01 ENCOUNTER — APPOINTMENT (OUTPATIENT)
Dept: GERIATRICS | Facility: CLINIC | Age: 89
End: 2024-11-01
Payer: MEDICARE

## 2024-11-01 VITALS
TEMPERATURE: 98.8 F | OXYGEN SATURATION: 98 % | BODY MASS INDEX: 19.2 KG/M2 | DIASTOLIC BLOOD PRESSURE: 73 MMHG | SYSTOLIC BLOOD PRESSURE: 193 MMHG | WEIGHT: 105 LBS | RESPIRATION RATE: 18 BRPM | HEART RATE: 102 BPM

## 2024-11-01 DIAGNOSIS — R05.9 COUGH, UNSPECIFIED: ICD-10-CM

## 2024-11-01 DIAGNOSIS — R05.1 ACUTE COUGH: ICD-10-CM

## 2024-11-01 PROCEDURE — 71045 X-RAY EXAM CHEST 1 VIEW: CPT | Mod: 26

## 2024-11-01 PROCEDURE — 71045 X-RAY EXAM CHEST 1 VIEW: CPT

## 2024-11-01 PROCEDURE — 99214 OFFICE O/P EST MOD 30 MIN: CPT

## 2024-11-01 RX ORDER — AMOXICILLIN AND CLAVULANATE POTASSIUM 875; 125 MG/1; MG/1
875-125 TABLET, COATED ORAL TWICE DAILY
Qty: 10 | Refills: 0 | Status: ACTIVE | COMMUNITY
Start: 2024-11-01 | End: 1900-01-01

## 2024-11-01 RX ORDER — AZITHROMYCIN 500 MG/1
500 TABLET, FILM COATED ORAL DAILY
Qty: 1 | Refills: 0 | Status: ACTIVE | COMMUNITY
Start: 2024-11-01 | End: 1900-01-01

## 2024-11-01 RX ORDER — BENZONATATE 100 MG/1
100 CAPSULE ORAL
Qty: 30 | Refills: 0 | Status: ACTIVE | COMMUNITY
Start: 2024-11-01 | End: 1900-01-01

## 2024-11-02 ENCOUNTER — NON-APPOINTMENT (OUTPATIENT)
Age: 89
End: 2024-11-02

## 2024-11-04 LAB
ANION GAP SERPL CALC-SCNC: 14 MMOL/L
BASOPHILS # BLD AUTO: 0.03 K/UL
BASOPHILS NFR BLD AUTO: 0.4 %
BUN SERPL-MCNC: 16 MG/DL
CALCIUM SERPL-MCNC: 9.5 MG/DL
CHLORIDE SERPL-SCNC: 104 MMOL/L
CO2 SERPL-SCNC: 24 MMOL/L
CREAT SERPL-MCNC: 1.16 MG/DL
EGFR: 45 ML/MIN/1.73M2
EOSINOPHIL # BLD AUTO: 0.16 K/UL
EOSINOPHIL NFR BLD AUTO: 2.3 %
GLUCOSE SERPL-MCNC: 105 MG/DL
HCT VFR BLD CALC: 43.3 %
HGB BLD-MCNC: 14.3 G/DL
IMM GRANULOCYTES NFR BLD AUTO: 0.1 %
LYMPHOCYTES # BLD AUTO: 1.26 K/UL
LYMPHOCYTES NFR BLD AUTO: 17.7 %
MAN DIFF?: NORMAL
MCHC RBC-ENTMCNC: 30 PG
MCHC RBC-ENTMCNC: 33 G/DL
MCV RBC AUTO: 90.8 FL
MONOCYTES # BLD AUTO: 0.73 K/UL
MONOCYTES NFR BLD AUTO: 10.3 %
NEUTROPHILS # BLD AUTO: 4.91 K/UL
NEUTROPHILS NFR BLD AUTO: 69.2 %
PLATELET # BLD AUTO: 338 K/UL
POTASSIUM SERPL-SCNC: 4.8 MMOL/L
RBC # BLD: 4.77 M/UL
RBC # FLD: 13.2 %
SODIUM SERPL-SCNC: 141 MMOL/L
WBC # FLD AUTO: 7.1 K/UL

## 2025-01-27 ENCOUNTER — APPOINTMENT (OUTPATIENT)
Age: 89
End: 2025-01-27
Payer: MEDICARE

## 2025-01-27 VITALS
HEIGHT: 62 IN | SYSTOLIC BLOOD PRESSURE: 161 MMHG | TEMPERATURE: 97.2 F | RESPIRATION RATE: 18 BRPM | HEART RATE: 93 BPM | WEIGHT: 104 LBS | BODY MASS INDEX: 19.14 KG/M2 | DIASTOLIC BLOOD PRESSURE: 87 MMHG | OXYGEN SATURATION: 94 %

## 2025-01-27 DIAGNOSIS — I10 ESSENTIAL (PRIMARY) HYPERTENSION: ICD-10-CM

## 2025-01-27 DIAGNOSIS — G30.9 ALZHEIMER'S DISEASE, UNSPECIFIED: ICD-10-CM

## 2025-01-27 DIAGNOSIS — R39.81 FUNCTIONAL URINARY INCONTINENCE: ICD-10-CM

## 2025-01-27 DIAGNOSIS — L30.9 DERMATITIS, UNSPECIFIED: ICD-10-CM

## 2025-01-27 DIAGNOSIS — F02.818 ALZHEIMER'S DISEASE, UNSPECIFIED: ICD-10-CM

## 2025-01-27 PROCEDURE — 99214 OFFICE O/P EST MOD 30 MIN: CPT

## 2025-01-27 RX ORDER — TRIAMCINOLONE ACETONIDE 0.25 MG/G
0.03 CREAM TOPICAL TWICE DAILY
Qty: 1 | Refills: 0 | Status: ACTIVE | COMMUNITY
Start: 2025-01-27 | End: 1900-01-01

## 2025-01-27 RX ORDER — CARVEDILOL 12.5 MG/1
12.5 TABLET, FILM COATED ORAL TWICE DAILY
Qty: 180 | Refills: 0 | Status: ACTIVE | COMMUNITY
Start: 2025-01-27 | End: 1900-01-01

## 2025-01-30 RX ORDER — UNDERPADS 23" X 36"
EACH MISCELLANEOUS
Qty: 8 | Refills: 0 | Status: ACTIVE | OUTPATIENT
Start: 2025-01-30

## 2025-01-31 ENCOUNTER — NON-APPOINTMENT (OUTPATIENT)
Age: 89
End: 2025-01-31

## 2025-04-07 ENCOUNTER — NON-APPOINTMENT (OUTPATIENT)
Age: 89
End: 2025-04-07

## 2025-05-19 ENCOUNTER — APPOINTMENT (OUTPATIENT)
Dept: NEUROLOGY | Facility: CLINIC | Age: 89
End: 2025-05-19
Payer: MEDICARE

## 2025-05-19 VITALS — HEART RATE: 87 BPM | SYSTOLIC BLOOD PRESSURE: 181 MMHG | DIASTOLIC BLOOD PRESSURE: 118 MMHG | HEIGHT: 62 IN

## 2025-05-19 VITALS — BODY MASS INDEX: 20.67 KG/M2 | WEIGHT: 113 LBS

## 2025-05-19 PROCEDURE — 99214 OFFICE O/P EST MOD 30 MIN: CPT

## 2025-05-21 ENCOUNTER — APPOINTMENT (OUTPATIENT)
Dept: GERIATRICS | Facility: CLINIC | Age: 89
End: 2025-05-21
Payer: MEDICARE

## 2025-05-21 VITALS
RESPIRATION RATE: 16 BRPM | TEMPERATURE: 97.6 F | OXYGEN SATURATION: 95 % | HEART RATE: 71 BPM | DIASTOLIC BLOOD PRESSURE: 102 MMHG | SYSTOLIC BLOOD PRESSURE: 182 MMHG

## 2025-05-21 DIAGNOSIS — Z23 ENCOUNTER FOR IMMUNIZATION: ICD-10-CM

## 2025-05-21 DIAGNOSIS — F02.818 ALZHEIMER'S DISEASE, UNSPECIFIED: ICD-10-CM

## 2025-05-21 DIAGNOSIS — M79.89 OTHER SPECIFIED SOFT TISSUE DISORDERS: ICD-10-CM

## 2025-05-21 DIAGNOSIS — G30.9 ALZHEIMER'S DISEASE, UNSPECIFIED: ICD-10-CM

## 2025-05-21 DIAGNOSIS — R39.81 FUNCTIONAL URINARY INCONTINENCE: ICD-10-CM

## 2025-05-21 DIAGNOSIS — Z00.00 ENCOUNTER FOR GENERAL ADULT MEDICAL EXAMINATION W/OUT ABNORMAL FINDINGS: ICD-10-CM

## 2025-05-21 DIAGNOSIS — Z09 ENCOUNTER FOR FOLLOW-UP EXAMINATION AFTER COMPLETED TREATMENT FOR CONDITIONS OTHER THAN MALIGNANT NEOPLASM: ICD-10-CM

## 2025-05-21 DIAGNOSIS — R82.90 UNSPECIFIED ABNORMAL FINDINGS IN URINE: ICD-10-CM

## 2025-05-21 DIAGNOSIS — Z92.29 PERSONAL HISTORY OF OTHER DRUG THERAPY: ICD-10-CM

## 2025-05-21 DIAGNOSIS — R21 RASH AND OTHER NONSPECIFIC SKIN ERUPTION: ICD-10-CM

## 2025-05-21 PROCEDURE — G0439: CPT

## 2025-05-21 PROCEDURE — 99214 OFFICE O/P EST MOD 30 MIN: CPT

## 2025-05-28 PROBLEM — Z92.29 HISTORY OF PNEUMOCOCCAL VACCINATION: Status: RESOLVED | Noted: 2022-09-12 | Resolved: 2025-05-28

## 2025-05-28 PROBLEM — R82.90 CLOUDY URINE: Status: RESOLVED | Noted: 2023-06-14 | Resolved: 2025-05-28

## 2025-05-28 PROBLEM — M79.89 HAND SWELLING: Status: RESOLVED | Noted: 2021-08-17 | Resolved: 2025-05-28

## 2025-05-28 PROBLEM — R21 RASH OF BACK: Status: ACTIVE | Noted: 2025-05-28

## 2025-05-28 PROBLEM — Z23 NEED FOR INFLUENZA VACCINATION: Status: RESOLVED | Noted: 2022-01-10 | Resolved: 2025-05-28

## 2025-05-28 PROBLEM — R82.90 FOUL SMELLING URINE: Status: RESOLVED | Noted: 2023-04-26 | Resolved: 2025-05-28

## 2025-05-28 PROBLEM — Z09 NEED FOR IMMUNIZATION FOLLOW-UP: Status: RESOLVED | Noted: 2021-08-17 | Resolved: 2025-05-28

## 2025-05-28 RX ORDER — CLOTRIMAZOLE 10 MG/G
1 CREAM TOPICAL TWICE DAILY
Qty: 1 | Refills: 0 | Status: ACTIVE | COMMUNITY
Start: 2025-05-28 | End: 1900-01-01

## 2025-05-28 NOTE — PHYSICAL THERAPY INITIAL EVALUATION ADULT - ACTIVE RANGE OF MOTION EXAMINATION, REHAB EVAL
bilateral upper extremity Active ROM was WFL (within functional limits)/bilateral  lower extremity Active ROM was WFL (within functional limits)
none

## 2025-06-02 ENCOUNTER — APPOINTMENT (OUTPATIENT)
Age: 89
End: 2025-06-02